# Patient Record
Sex: MALE | Race: WHITE | NOT HISPANIC OR LATINO
[De-identification: names, ages, dates, MRNs, and addresses within clinical notes are randomized per-mention and may not be internally consistent; named-entity substitution may affect disease eponyms.]

---

## 2017-02-17 ENCOUNTER — MEDICATION RENEWAL (OUTPATIENT)
Age: 47
End: 2017-02-17

## 2017-03-27 ENCOUNTER — APPOINTMENT (OUTPATIENT)
Dept: PULMONOLOGY | Facility: CLINIC | Age: 47
End: 2017-03-27

## 2017-03-27 VITALS
BODY MASS INDEX: 30.01 KG/M2 | SYSTOLIC BLOOD PRESSURE: 101 MMHG | HEART RATE: 87 BPM | WEIGHT: 198 LBS | RESPIRATION RATE: 16 BRPM | OXYGEN SATURATION: 98 % | HEIGHT: 68 IN | DIASTOLIC BLOOD PRESSURE: 70 MMHG | TEMPERATURE: 96.7 F

## 2017-03-28 ENCOUNTER — MEDICATION RENEWAL (OUTPATIENT)
Age: 47
End: 2017-03-28

## 2017-04-05 ENCOUNTER — OTHER (OUTPATIENT)
Age: 47
End: 2017-04-05

## 2017-04-07 ENCOUNTER — OUTPATIENT (OUTPATIENT)
Dept: OUTPATIENT SERVICES | Facility: HOSPITAL | Age: 47
LOS: 1 days | End: 2017-04-07
Payer: COMMERCIAL

## 2017-04-07 ENCOUNTER — APPOINTMENT (OUTPATIENT)
Dept: RADIOLOGY | Facility: IMAGING CENTER | Age: 47
End: 2017-04-07

## 2017-04-07 DIAGNOSIS — Z98.89 OTHER SPECIFIED POSTPROCEDURAL STATES: Chronic | ICD-10-CM

## 2017-04-07 DIAGNOSIS — Z00.8 ENCOUNTER FOR OTHER GENERAL EXAMINATION: ICD-10-CM

## 2017-04-07 PROCEDURE — 77080 DXA BONE DENSITY AXIAL: CPT

## 2017-04-26 ENCOUNTER — APPOINTMENT (OUTPATIENT)
Dept: PULMONOLOGY | Facility: CLINIC | Age: 47
End: 2017-04-26

## 2017-04-26 VITALS
HEIGHT: 68 IN | BODY MASS INDEX: 31.07 KG/M2 | TEMPERATURE: 99 F | SYSTOLIC BLOOD PRESSURE: 110 MMHG | DIASTOLIC BLOOD PRESSURE: 80 MMHG | RESPIRATION RATE: 18 BRPM | HEART RATE: 103 BPM | WEIGHT: 205 LBS

## 2017-05-22 ENCOUNTER — OTHER (OUTPATIENT)
Age: 47
End: 2017-05-22

## 2017-05-22 ENCOUNTER — APPOINTMENT (OUTPATIENT)
Dept: PULMONOLOGY | Facility: CLINIC | Age: 47
End: 2017-05-22

## 2017-06-19 ENCOUNTER — APPOINTMENT (OUTPATIENT)
Dept: PULMONOLOGY | Facility: CLINIC | Age: 47
End: 2017-06-19

## 2017-06-19 VITALS
TEMPERATURE: 97.8 F | HEART RATE: 67 BPM | HEIGHT: 68 IN | WEIGHT: 200 LBS | DIASTOLIC BLOOD PRESSURE: 90 MMHG | BODY MASS INDEX: 30.31 KG/M2 | RESPIRATION RATE: 15 BRPM | SYSTOLIC BLOOD PRESSURE: 120 MMHG

## 2017-06-22 ENCOUNTER — MEDICATION RENEWAL (OUTPATIENT)
Age: 47
End: 2017-06-22

## 2017-07-18 ENCOUNTER — APPOINTMENT (OUTPATIENT)
Dept: PULMONOLOGY | Facility: CLINIC | Age: 47
End: 2017-07-18

## 2017-07-18 VITALS
HEIGHT: 68 IN | SYSTOLIC BLOOD PRESSURE: 110 MMHG | RESPIRATION RATE: 14 BRPM | DIASTOLIC BLOOD PRESSURE: 60 MMHG | BODY MASS INDEX: 30.01 KG/M2 | OXYGEN SATURATION: 96 % | WEIGHT: 198 LBS | HEART RATE: 71 BPM | TEMPERATURE: 97 F

## 2017-10-05 ENCOUNTER — APPOINTMENT (OUTPATIENT)
Dept: PULMONOLOGY | Facility: CLINIC | Age: 47
End: 2017-10-05
Payer: COMMERCIAL

## 2017-10-05 VITALS
SYSTOLIC BLOOD PRESSURE: 139 MMHG | HEIGHT: 68 IN | BODY MASS INDEX: 30.01 KG/M2 | TEMPERATURE: 95.9 F | WEIGHT: 198 LBS | DIASTOLIC BLOOD PRESSURE: 80 MMHG | OXYGEN SATURATION: 97 % | RESPIRATION RATE: 16 BRPM | HEART RATE: 87 BPM

## 2017-10-05 PROCEDURE — 94729 DIFFUSING CAPACITY: CPT

## 2017-10-05 PROCEDURE — 99213 OFFICE O/P EST LOW 20 MIN: CPT | Mod: 25

## 2017-10-05 PROCEDURE — ZZZZZ: CPT

## 2017-10-05 PROCEDURE — 94726 PLETHYSMOGRAPHY LUNG VOLUMES: CPT

## 2017-10-05 PROCEDURE — 94010 BREATHING CAPACITY TEST: CPT

## 2017-12-05 ENCOUNTER — APPOINTMENT (OUTPATIENT)
Dept: PULMONOLOGY | Facility: CLINIC | Age: 47
End: 2017-12-05
Payer: COMMERCIAL

## 2017-12-05 VITALS
HEIGHT: 68 IN | OXYGEN SATURATION: 97 % | DIASTOLIC BLOOD PRESSURE: 80 MMHG | HEART RATE: 83 BPM | WEIGHT: 192 LBS | BODY MASS INDEX: 29.1 KG/M2 | SYSTOLIC BLOOD PRESSURE: 129 MMHG | TEMPERATURE: 97.1 F | RESPIRATION RATE: 16 BRPM

## 2017-12-05 PROCEDURE — 99213 OFFICE O/P EST LOW 20 MIN: CPT | Mod: 25

## 2017-12-05 PROCEDURE — 94729 DIFFUSING CAPACITY: CPT

## 2017-12-05 PROCEDURE — 94010 BREATHING CAPACITY TEST: CPT

## 2017-12-05 PROCEDURE — ZZZZZ: CPT

## 2017-12-05 PROCEDURE — 94726 PLETHYSMOGRAPHY LUNG VOLUMES: CPT

## 2017-12-12 ENCOUNTER — APPOINTMENT (OUTPATIENT)
Dept: UROLOGY | Facility: CLINIC | Age: 47
End: 2017-12-12
Payer: COMMERCIAL

## 2017-12-12 VITALS
HEIGHT: 67 IN | BODY MASS INDEX: 30.61 KG/M2 | HEART RATE: 123 BPM | WEIGHT: 195 LBS | SYSTOLIC BLOOD PRESSURE: 146 MMHG | DIASTOLIC BLOOD PRESSURE: 74 MMHG | RESPIRATION RATE: 18 BRPM

## 2017-12-12 PROCEDURE — 99244 OFF/OP CNSLTJ NEW/EST MOD 40: CPT

## 2017-12-12 RX ORDER — PAPAVERINE HYDROCHLORIDE 30 MG/ML
30 INJECTION, SOLUTION INTRAVENOUS
Qty: 2 | Refills: 3 | Status: ACTIVE | COMMUNITY
Start: 2017-12-12 | End: 1900-01-01

## 2017-12-13 LAB
25(OH)D3 SERPL-MCNC: 14.8 NG/ML
ALBUMIN SERPL ELPH-MCNC: 4.5 G/DL
ALP BLD-CCNC: 21 U/L
ALT SERPL-CCNC: 21 U/L
ANION GAP SERPL CALC-SCNC: 15 MMOL/L
AST SERPL-CCNC: 25 U/L
BASOPHILS # BLD AUTO: 0.05 K/UL
BASOPHILS NFR BLD AUTO: 0.7 %
BILIRUB SERPL-MCNC: 0.4 MG/DL
BUN SERPL-MCNC: 15 MG/DL
CALCIUM SERPL-MCNC: 9.6 MG/DL
CHLORIDE SERPL-SCNC: 103 MMOL/L
CHOLEST SERPL-MCNC: 198 MG/DL
CHOLEST/HDLC SERPL: 4.1 RATIO
CO2 SERPL-SCNC: 25 MMOL/L
CREAT SERPL-MCNC: 1.35 MG/DL
EOSINOPHIL # BLD AUTO: 0.08 K/UL
EOSINOPHIL NFR BLD AUTO: 1.1 %
ESTRADIOL SERPL-MCNC: 16 PG/ML
FSH SERPL-MCNC: 5.7 IU/L
GLUCOSE SERPL-MCNC: 73 MG/DL
HBA1C MFR BLD HPLC: 5.4 %
HCT VFR BLD CALC: 42.8 %
HDLC SERPL-MCNC: 48 MG/DL
HGB BLD-MCNC: 14.5 G/DL
IMM GRANULOCYTES NFR BLD AUTO: 0.3 %
LDLC SERPL CALC-MCNC: 110 MG/DL
LH SERPL-ACNC: 6.6 IU/L
LYMPHOCYTES # BLD AUTO: 1.4 K/UL
LYMPHOCYTES NFR BLD AUTO: 19.2 %
MAN DIFF?: NORMAL
MCHC RBC-ENTMCNC: 29.8 PG
MCHC RBC-ENTMCNC: 33.9 GM/DL
MCV RBC AUTO: 87.9 FL
MONOCYTES # BLD AUTO: 1.03 K/UL
MONOCYTES NFR BLD AUTO: 14.1 %
NEUTROPHILS # BLD AUTO: 4.71 K/UL
NEUTROPHILS NFR BLD AUTO: 64.6 %
PLATELET # BLD AUTO: 280 K/UL
POTASSIUM SERPL-SCNC: 4.7 MMOL/L
PROT SERPL-MCNC: 7.7 G/DL
RBC # BLD: 4.87 M/UL
RBC # FLD: 12.9 %
SODIUM SERPL-SCNC: 143 MMOL/L
TRIGL SERPL-MCNC: 201 MG/DL
TSH SERPL-ACNC: 2.32 UIU/ML
WBC # FLD AUTO: 7.29 K/UL

## 2017-12-17 ENCOUNTER — APPOINTMENT (OUTPATIENT)
Dept: CT IMAGING | Facility: IMAGING CENTER | Age: 47
End: 2017-12-17
Payer: COMMERCIAL

## 2017-12-17 ENCOUNTER — OUTPATIENT (OUTPATIENT)
Dept: OUTPATIENT SERVICES | Facility: HOSPITAL | Age: 47
LOS: 1 days | End: 2017-12-17
Payer: COMMERCIAL

## 2017-12-17 DIAGNOSIS — D49.89 NEOPLASM OF UNSPECIFIED BEHAVIOR OF OTHER SPECIFIED SITES: ICD-10-CM

## 2017-12-17 DIAGNOSIS — D86.9 SARCOIDOSIS, UNSPECIFIED: ICD-10-CM

## 2017-12-17 DIAGNOSIS — Z98.89 OTHER SPECIFIED POSTPROCEDURAL STATES: Chronic | ICD-10-CM

## 2017-12-17 PROCEDURE — 71250 CT THORAX DX C-: CPT

## 2017-12-17 PROCEDURE — 71250 CT THORAX DX C-: CPT | Mod: 26

## 2017-12-18 LAB
TESTOST BND SERPL-MCNC: 10.1 PG/ML
TESTOST SERPL-MCNC: 475.8 NG/DL

## 2018-01-09 ENCOUNTER — APPOINTMENT (OUTPATIENT)
Dept: UROLOGY | Facility: CLINIC | Age: 48
End: 2018-01-09
Payer: COMMERCIAL

## 2018-01-09 ENCOUNTER — OUTPATIENT (OUTPATIENT)
Dept: OUTPATIENT SERVICES | Facility: HOSPITAL | Age: 48
LOS: 1 days | End: 2018-01-09
Payer: COMMERCIAL

## 2018-01-09 VITALS
BODY MASS INDEX: 30.61 KG/M2 | TEMPERATURE: 98.1 F | DIASTOLIC BLOOD PRESSURE: 90 MMHG | RESPIRATION RATE: 16 BRPM | HEART RATE: 82 BPM | SYSTOLIC BLOOD PRESSURE: 136 MMHG | WEIGHT: 195 LBS | HEIGHT: 67 IN

## 2018-01-09 DIAGNOSIS — R35.0 FREQUENCY OF MICTURITION: ICD-10-CM

## 2018-01-09 DIAGNOSIS — Z98.89 OTHER SPECIFIED POSTPROCEDURAL STATES: Chronic | ICD-10-CM

## 2018-01-09 DIAGNOSIS — Z00.00 ENCOUNTER FOR GENERAL ADULT MEDICAL EXAMINATION W/OUT ABNORMAL FINDINGS: ICD-10-CM

## 2018-01-09 PROCEDURE — 93980 PENILE VASCULAR STUDY: CPT

## 2018-01-09 PROCEDURE — 54235 NJX CORPORA CAVERNOSA RX AGT: CPT

## 2018-01-09 PROCEDURE — 99214 OFFICE O/P EST MOD 30 MIN: CPT | Mod: 25

## 2018-01-09 PROCEDURE — 93980 PENILE VASCULAR STUDY: CPT | Mod: 26

## 2018-01-11 DIAGNOSIS — N52.9 MALE ERECTILE DYSFUNCTION, UNSPECIFIED: ICD-10-CM

## 2018-02-13 ENCOUNTER — APPOINTMENT (OUTPATIENT)
Dept: PULMONOLOGY | Facility: CLINIC | Age: 48
End: 2018-02-13
Payer: COMMERCIAL

## 2018-02-13 VITALS
BODY MASS INDEX: 29.82 KG/M2 | OXYGEN SATURATION: 98 % | TEMPERATURE: 98 F | SYSTOLIC BLOOD PRESSURE: 118 MMHG | HEIGHT: 67 IN | HEIGHT: 67 IN | HEART RATE: 63 BPM | RESPIRATION RATE: 15 BRPM | BODY MASS INDEX: 30.13 KG/M2 | DIASTOLIC BLOOD PRESSURE: 60 MMHG | WEIGHT: 190 LBS | WEIGHT: 192 LBS

## 2018-02-13 PROCEDURE — 94010 BREATHING CAPACITY TEST: CPT

## 2018-02-13 PROCEDURE — 99213 OFFICE O/P EST LOW 20 MIN: CPT | Mod: 25

## 2018-02-13 PROCEDURE — 94729 DIFFUSING CAPACITY: CPT

## 2018-02-13 PROCEDURE — 94726 PLETHYSMOGRAPHY LUNG VOLUMES: CPT

## 2018-02-13 PROCEDURE — ZZZZZ: CPT

## 2018-04-10 ENCOUNTER — APPOINTMENT (OUTPATIENT)
Dept: UROLOGY | Facility: CLINIC | Age: 48
End: 2018-04-10
Payer: COMMERCIAL

## 2018-04-10 PROCEDURE — 99214 OFFICE O/P EST MOD 30 MIN: CPT

## 2018-04-10 RX ORDER — SILDENAFIL 20 MG/1
20 TABLET ORAL
Qty: 50 | Refills: 3 | Status: ACTIVE | COMMUNITY
Start: 2017-12-12 | End: 1900-01-01

## 2018-04-17 ENCOUNTER — APPOINTMENT (OUTPATIENT)
Dept: PULMONOLOGY | Facility: CLINIC | Age: 48
End: 2018-04-17
Payer: COMMERCIAL

## 2018-04-17 VITALS — WEIGHT: 196 LBS | BODY MASS INDEX: 30.76 KG/M2 | HEIGHT: 67 IN

## 2018-04-17 VITALS
WEIGHT: 194 LBS | BODY MASS INDEX: 30.45 KG/M2 | OXYGEN SATURATION: 97 % | RESPIRATION RATE: 16 BRPM | TEMPERATURE: 96.3 F | HEART RATE: 95 BPM | DIASTOLIC BLOOD PRESSURE: 70 MMHG | HEIGHT: 67 IN | SYSTOLIC BLOOD PRESSURE: 120 MMHG

## 2018-04-17 PROCEDURE — 94726 PLETHYSMOGRAPHY LUNG VOLUMES: CPT

## 2018-04-17 PROCEDURE — 94729 DIFFUSING CAPACITY: CPT

## 2018-04-17 PROCEDURE — ZZZZZ: CPT

## 2018-04-17 PROCEDURE — 94010 BREATHING CAPACITY TEST: CPT

## 2018-04-17 PROCEDURE — 99213 OFFICE O/P EST LOW 20 MIN: CPT | Mod: 25

## 2018-04-17 RX ORDER — ERGOCALCIFEROL 1.25 MG/1
1.25 MG CAPSULE, LIQUID FILLED ORAL
Qty: 12 | Refills: 0 | Status: ACTIVE | COMMUNITY
Start: 2018-04-09

## 2018-05-30 ENCOUNTER — CHART COPY (OUTPATIENT)
Age: 48
End: 2018-05-30

## 2018-06-07 ENCOUNTER — APPOINTMENT (OUTPATIENT)
Dept: PULMONOLOGY | Facility: CLINIC | Age: 48
End: 2018-06-07
Payer: COMMERCIAL

## 2018-06-07 VITALS
BODY MASS INDEX: 30.13 KG/M2 | RESPIRATION RATE: 15 BRPM | DIASTOLIC BLOOD PRESSURE: 70 MMHG | SYSTOLIC BLOOD PRESSURE: 124 MMHG | WEIGHT: 192 LBS | HEART RATE: 106 BPM | TEMPERATURE: 97 F | OXYGEN SATURATION: 96 % | HEIGHT: 67 IN

## 2018-06-07 PROCEDURE — 94729 DIFFUSING CAPACITY: CPT

## 2018-06-07 PROCEDURE — 94010 BREATHING CAPACITY TEST: CPT

## 2018-06-07 PROCEDURE — ZZZZZ: CPT

## 2018-06-07 PROCEDURE — 99202 OFFICE O/P NEW SF 15 MIN: CPT | Mod: 25

## 2018-06-07 PROCEDURE — 99212 OFFICE O/P EST SF 10 MIN: CPT | Mod: 25

## 2018-06-07 PROCEDURE — 94726 PLETHYSMOGRAPHY LUNG VOLUMES: CPT

## 2018-07-02 ENCOUNTER — OTHER (OUTPATIENT)
Age: 48
End: 2018-07-02

## 2018-07-03 ENCOUNTER — OUTPATIENT (OUTPATIENT)
Dept: OUTPATIENT SERVICES | Facility: HOSPITAL | Age: 48
LOS: 1 days | End: 2018-07-03
Payer: COMMERCIAL

## 2018-07-03 ENCOUNTER — APPOINTMENT (OUTPATIENT)
Dept: RADIOLOGY | Facility: IMAGING CENTER | Age: 48
End: 2018-07-03
Payer: COMMERCIAL

## 2018-07-03 ENCOUNTER — APPOINTMENT (OUTPATIENT)
Dept: PULMONOLOGY | Facility: CLINIC | Age: 48
End: 2018-07-03
Payer: COMMERCIAL

## 2018-07-03 ENCOUNTER — NON-APPOINTMENT (OUTPATIENT)
Age: 48
End: 2018-07-03

## 2018-07-03 VITALS
TEMPERATURE: 96.7 F | OXYGEN SATURATION: 98 % | RESPIRATION RATE: 15 BRPM | HEART RATE: 77 BPM | SYSTOLIC BLOOD PRESSURE: 132 MMHG | HEIGHT: 67 IN | WEIGHT: 198 LBS | BODY MASS INDEX: 31.08 KG/M2 | DIASTOLIC BLOOD PRESSURE: 70 MMHG

## 2018-07-03 DIAGNOSIS — Z98.89 OTHER SPECIFIED POSTPROCEDURAL STATES: Chronic | ICD-10-CM

## 2018-07-03 DIAGNOSIS — D86.9 SARCOIDOSIS, UNSPECIFIED: ICD-10-CM

## 2018-07-03 PROCEDURE — 99213 OFFICE O/P EST LOW 20 MIN: CPT

## 2018-07-03 PROCEDURE — 71046 X-RAY EXAM CHEST 2 VIEWS: CPT | Mod: 26

## 2018-07-03 PROCEDURE — 71046 X-RAY EXAM CHEST 2 VIEWS: CPT

## 2018-07-10 ENCOUNTER — APPOINTMENT (OUTPATIENT)
Dept: UROLOGY | Facility: CLINIC | Age: 48
End: 2018-07-10
Payer: COMMERCIAL

## 2018-07-10 PROCEDURE — 99214 OFFICE O/P EST MOD 30 MIN: CPT

## 2018-08-02 ENCOUNTER — APPOINTMENT (OUTPATIENT)
Dept: PULMONOLOGY | Facility: CLINIC | Age: 48
End: 2018-08-02
Payer: COMMERCIAL

## 2018-08-02 VITALS
RESPIRATION RATE: 16 BRPM | DIASTOLIC BLOOD PRESSURE: 86 MMHG | WEIGHT: 195 LBS | HEIGHT: 67 IN | SYSTOLIC BLOOD PRESSURE: 130 MMHG | TEMPERATURE: 98.4 F | HEART RATE: 94 BPM | BODY MASS INDEX: 30.61 KG/M2

## 2018-08-02 PROCEDURE — ZZZZZ: CPT

## 2018-08-02 PROCEDURE — 94010 BREATHING CAPACITY TEST: CPT

## 2018-08-02 PROCEDURE — 99214 OFFICE O/P EST MOD 30 MIN: CPT | Mod: 25

## 2018-08-02 PROCEDURE — 94726 PLETHYSMOGRAPHY LUNG VOLUMES: CPT

## 2018-08-02 PROCEDURE — 94729 DIFFUSING CAPACITY: CPT

## 2018-09-12 ENCOUNTER — APPOINTMENT (OUTPATIENT)
Dept: PULMONOLOGY | Facility: CLINIC | Age: 48
End: 2018-09-12
Payer: COMMERCIAL

## 2018-09-12 VITALS
BODY MASS INDEX: 30.61 KG/M2 | TEMPERATURE: 97.8 F | HEIGHT: 67 IN | SYSTOLIC BLOOD PRESSURE: 140 MMHG | HEART RATE: 78 BPM | WEIGHT: 195 LBS | DIASTOLIC BLOOD PRESSURE: 94 MMHG | RESPIRATION RATE: 16 BRPM

## 2018-09-12 DIAGNOSIS — D49.89 NEOPLASM OF UNSPECIFIED BEHAVIOR OF OTHER SPECIFIED SITES: ICD-10-CM

## 2018-09-12 PROCEDURE — 94726 PLETHYSMOGRAPHY LUNG VOLUMES: CPT

## 2018-09-12 PROCEDURE — 99214 OFFICE O/P EST MOD 30 MIN: CPT | Mod: 25

## 2018-09-12 PROCEDURE — ZZZZZ: CPT

## 2018-09-12 PROCEDURE — 94010 BREATHING CAPACITY TEST: CPT

## 2018-09-12 PROCEDURE — 94729 DIFFUSING CAPACITY: CPT

## 2018-10-09 ENCOUNTER — APPOINTMENT (OUTPATIENT)
Dept: UROLOGY | Facility: CLINIC | Age: 48
End: 2018-10-09

## 2018-11-05 ENCOUNTER — MEDICATION RENEWAL (OUTPATIENT)
Age: 48
End: 2018-11-05

## 2018-12-18 ENCOUNTER — OUTPATIENT (OUTPATIENT)
Dept: OUTPATIENT SERVICES | Facility: HOSPITAL | Age: 48
LOS: 1 days | End: 2018-12-18
Payer: COMMERCIAL

## 2018-12-18 ENCOUNTER — APPOINTMENT (OUTPATIENT)
Dept: CT IMAGING | Facility: IMAGING CENTER | Age: 48
End: 2018-12-18
Payer: COMMERCIAL

## 2018-12-18 DIAGNOSIS — Z98.89 OTHER SPECIFIED POSTPROCEDURAL STATES: Chronic | ICD-10-CM

## 2018-12-18 DIAGNOSIS — Z00.8 ENCOUNTER FOR OTHER GENERAL EXAMINATION: ICD-10-CM

## 2018-12-18 DIAGNOSIS — D86.9 SARCOIDOSIS, UNSPECIFIED: ICD-10-CM

## 2018-12-18 PROCEDURE — 71250 CT THORAX DX C-: CPT | Mod: 26

## 2018-12-18 PROCEDURE — 71250 CT THORAX DX C-: CPT

## 2019-01-15 ENCOUNTER — APPOINTMENT (OUTPATIENT)
Dept: PULMONOLOGY | Facility: CLINIC | Age: 49
End: 2019-01-15
Payer: COMMERCIAL

## 2019-01-15 VITALS
RESPIRATION RATE: 16 BRPM | BODY MASS INDEX: 31.55 KG/M2 | SYSTOLIC BLOOD PRESSURE: 127 MMHG | HEART RATE: 71 BPM | OXYGEN SATURATION: 97 % | DIASTOLIC BLOOD PRESSURE: 91 MMHG | TEMPERATURE: 97.6 F | WEIGHT: 201 LBS | HEIGHT: 67 IN

## 2019-01-15 VITALS — BODY MASS INDEX: 31.08 KG/M2 | WEIGHT: 198 LBS | HEIGHT: 67 IN

## 2019-01-15 PROCEDURE — ZZZZZ: CPT

## 2019-01-15 PROCEDURE — 94729 DIFFUSING CAPACITY: CPT

## 2019-01-15 PROCEDURE — 99214 OFFICE O/P EST MOD 30 MIN: CPT | Mod: 25

## 2019-01-15 PROCEDURE — 94010 BREATHING CAPACITY TEST: CPT

## 2019-01-15 PROCEDURE — 94726 PLETHYSMOGRAPHY LUNG VOLUMES: CPT

## 2019-01-15 RX ORDER — PREDNISONE 5 MG/1
5 TABLET ORAL
Qty: 90 | Refills: 1 | Status: DISCONTINUED | COMMUNITY
Start: 2018-06-07 | End: 2019-01-15

## 2019-01-15 RX ORDER — PREDNISONE 5 MG/1
5 TABLET ORAL
Qty: 180 | Refills: 3 | Status: DISCONTINUED | COMMUNITY
Start: 2018-09-12 | End: 2019-01-15

## 2019-01-15 NOTE — DISCUSSION/SUMMARY
[FreeTextEntry1] : 49yo male patient with advanced pulmonary sarcoidosis here for PFT and f/u.  PFT shows much improvement in his flow rates and lung volumes, since he has been on 10mg of prednisone.  His chest CT shows stability of his sarcoid.  At this time, will opt to decrease prednisone to 7.5mg QD for the next 2 to 3 months.  He will return for f/u at that time with repeat lung function study.  If lung function is reduced, will consider starting second agent (i.e. methotrexate).  If lung function remains well, would opt to decrease prednisone by 1mg at that time.  At this time, he was encouraged to increase his physical activity.

## 2019-01-15 NOTE — REASON FOR VISIT
[Follow-Up] : a follow-up visit [Sarcoidosis] : sarcoidosis [Spouse] : spouse [Shortness of Breath] : shortness of Breath

## 2019-01-15 NOTE — HISTORY OF PRESENT ILLNESS
[FreeTextEntry1] : 47yo male patient with advanced pulmonary sarcoidosis here for PFT and f/u.  Currently on prednisone 10mg daily for three months.  In the past he had been dropped to 5mg from 10mg and experienced worsening dyspnea.  At that time, he was increased back to 10mg QD.  Patient reports improvement in his breathing at that time, but since December notes increased breathlessness and occasional wheezing with activity.  He does not do much in terms of physical activity.  Denies cough, chest discomfort, new joint pains or skin issues at this time.  \par \par His December 2018 chest CT was notable for: "Very subtle tiny nodules in the perilymphatic distribution ofboth lungs are unchanged when compared to previous exam. The finding is consistent with the patient's known history of sarcoidosis."\par

## 2019-01-15 NOTE — PHYSICAL EXAM
[General Appearance - Well Developed] : well developed [Normal Appearance] : normal appearance [Well Groomed] : well groomed [General Appearance - Well Nourished] : well nourished [General Appearance - In No Acute Distress] : no acute distress [Normal Conjunctiva] : the conjunctiva exhibited no abnormalities [Normal Oropharynx] : normal oropharynx [Neck Appearance] : the appearance of the neck was normal [Heart Rate And Rhythm] : heart rate and rhythm were normal [Heart Sounds] : normal S1 and S2 [] : no respiratory distress [Respiration, Rhythm And Depth] : normal respiratory rhythm and effort [Exaggerated Use Of Accessory Muscles For Inspiration] : no accessory muscle use [Auscultation Breath Sounds / Voice Sounds] : lungs were clear to auscultation bilaterally [Abnormal Walk] : normal gait [Nail Clubbing] : no clubbing of the fingernails [Cyanosis, Localized] : no localized cyanosis [No Venous Stasis] : no venous stasis [Skin Lesions] : no skin lesions [Sensation] : the sensory exam was normal to light touch and pinprick [Motor Exam] : the motor exam was normal [Affect] : the affect was normal [Mood] : the mood was normal [FreeTextEntry2] : no edema

## 2019-01-15 NOTE — REVIEW OF SYSTEMS
[Dyspnea] : dyspnea [Cough] : cough [Wheezing] : wheezing [Negative] : Musculoskeletal [Sputum] : not coughing up ~M sputum [Hemoptysis] : no hemoptysis [Chest Tightness] : no chest tightness [Pleuritic Pain] : no pleuritic pain

## 2019-03-30 NOTE — REASON FOR VISIT
[Follow-Up] : a follow-up visit [Sarcoidosis] : sarcoidosis [Shortness of Breath] : shortness of Breath [Spouse] : spouse

## 2019-04-02 ENCOUNTER — APPOINTMENT (OUTPATIENT)
Dept: PULMONOLOGY | Facility: CLINIC | Age: 49
End: 2019-04-02
Payer: COMMERCIAL

## 2019-04-02 VITALS
HEART RATE: 66 BPM | SYSTOLIC BLOOD PRESSURE: 126 MMHG | RESPIRATION RATE: 16 BRPM | WEIGHT: 188 LBS | TEMPERATURE: 98 F | DIASTOLIC BLOOD PRESSURE: 85 MMHG | OXYGEN SATURATION: 97 % | HEIGHT: 66 IN | BODY MASS INDEX: 30.22 KG/M2

## 2019-04-02 PROCEDURE — 94010 BREATHING CAPACITY TEST: CPT

## 2019-04-02 PROCEDURE — ZZZZZ: CPT

## 2019-04-02 PROCEDURE — 99214 OFFICE O/P EST MOD 30 MIN: CPT | Mod: 25

## 2019-04-02 NOTE — HISTORY OF PRESENT ILLNESS
[FreeTextEntry1] : 49yo male patient with advanced pulmonary sarcoidosis here for PFT and f/u.  Currently on 7.5 mg.He reports that he feels well. His dyspnea is improved but he has lost approximately 15 pounds since last visit. He denies any coughing, wheezing or chest discomfort. He denies any fevers, joint pains or skin eruptions.\par \par His December 2018 chest CT was notable for: "Very subtle tiny nodules in the perilymphatic distribution ofboth lungs are unchanged when compared to previous exam. The finding is consistent with the patient's known history of sarcoidosis."

## 2019-04-02 NOTE — REVIEW OF SYSTEMS
[Cough] : cough [Dyspnea] : dyspnea [Wheezing] : wheezing [Negative] : Dermatologic [Sputum] : not coughing up ~M sputum [Hemoptysis] : no hemoptysis [Chest Tightness] : no chest tightness [Pleuritic Pain] : no pleuritic pain

## 2019-04-02 NOTE — DISCUSSION/SUMMARY
[FreeTextEntry1] : 47yo male patient with advanced pulmonary sarcoidosis here for PFT and f/u.  Spirometry is relatively stable on 7.5 mg. His chest CT shows stability of his sarcoid.  At this time, will opt to maintain prednisone at 7.5mg QD for the next 3 months.  He will return for f/u at that time with repeat lung function study.  .  If lung function remains well, would opt to decrease prednisone by 1mg at that time.  At this time, he was encouraged to increase his physical activity.

## 2019-07-10 ENCOUNTER — APPOINTMENT (OUTPATIENT)
Dept: PULMONOLOGY | Facility: CLINIC | Age: 49
End: 2019-07-10
Payer: COMMERCIAL

## 2019-07-10 VITALS
TEMPERATURE: 97.4 F | DIASTOLIC BLOOD PRESSURE: 85 MMHG | OXYGEN SATURATION: 98 % | RESPIRATION RATE: 16 BRPM | BODY MASS INDEX: 28.89 KG/M2 | WEIGHT: 179 LBS | HEART RATE: 66 BPM | SYSTOLIC BLOOD PRESSURE: 135 MMHG

## 2019-07-10 PROCEDURE — 99213 OFFICE O/P EST LOW 20 MIN: CPT | Mod: 25

## 2019-07-10 PROCEDURE — ZZZZZ: CPT

## 2019-07-10 PROCEDURE — 94010 BREATHING CAPACITY TEST: CPT

## 2019-07-10 NOTE — DISCUSSION/SUMMARY
[FreeTextEntry1] : 49yo male patient with advanced pulmonary sarcoidosis here for PFT and f/u.  Spirometry is relatively stable on 7.5 mg. my current plan is to alternate 7.5 mg with 5 mg, on alternate days for 6 weeks and if he feels stable, to reduce to 5 mg per day and followup with me after an additional 6 weeks. he will have repeat lung functions at that time.

## 2019-07-10 NOTE — HISTORY OF PRESENT ILLNESS
[FreeTextEntry1] : 47yo male patient with advanced pulmonary sarcoidosis here for PFT and f/u.  Currently on 7.5 mg.He reports that he feels well. His dyspnea is improved . He denies any coughing, wheezing or chest discomfort. He denies any fevers, joint pains or skin eruptions.\par \par His December 2018 chest CT was notable for: "Very subtle tiny nodules in the perilymphatic distribution ofboth lungs are unchanged when compared to previous exam. The finding is consistent with the patient's known history of sarcoidosis."\par \par he has been on 7.5 mg of prednisone for 3 months.

## 2019-07-10 NOTE — PHYSICAL EXAM
[General Appearance - Well Developed] : well developed [Normal Appearance] : normal appearance [Well Groomed] : well groomed [General Appearance - Well Nourished] : well nourished [General Appearance - In No Acute Distress] : no acute distress [Normal Conjunctiva] : the conjunctiva exhibited no abnormalities [Normal Oropharynx] : normal oropharynx [Neck Appearance] : the appearance of the neck was normal [Heart Rate And Rhythm] : heart rate and rhythm were normal [Heart Sounds] : normal S1 and S2 [] : no respiratory distress [Exaggerated Use Of Accessory Muscles For Inspiration] : no accessory muscle use [Respiration, Rhythm And Depth] : normal respiratory rhythm and effort [Auscultation Breath Sounds / Voice Sounds] : lungs were clear to auscultation bilaterally [Abnormal Walk] : normal gait [Nail Clubbing] : no clubbing of the fingernails [Cyanosis, Localized] : no localized cyanosis [Skin Lesions] : no skin lesions [No Venous Stasis] : no venous stasis [Sensation] : the sensory exam was normal to light touch and pinprick [Motor Exam] : the motor exam was normal [Affect] : the affect was normal [Mood] : the mood was normal [FreeTextEntry2] : no edema

## 2019-08-29 ENCOUNTER — APPOINTMENT (OUTPATIENT)
Dept: UROLOGY | Facility: CLINIC | Age: 49
End: 2019-08-29
Payer: COMMERCIAL

## 2019-08-29 VITALS
RESPIRATION RATE: 16 BRPM | TEMPERATURE: 99.1 F | DIASTOLIC BLOOD PRESSURE: 81 MMHG | SYSTOLIC BLOOD PRESSURE: 123 MMHG | HEART RATE: 86 BPM

## 2019-08-29 DIAGNOSIS — N52.9 MALE ERECTILE DYSFUNCTION, UNSPECIFIED: ICD-10-CM

## 2019-08-29 PROCEDURE — 99214 OFFICE O/P EST MOD 30 MIN: CPT

## 2019-08-29 NOTE — HISTORY OF PRESENT ILLNESS
[FreeTextEntry1] : Patient with erectile dysfunction presents for follow up. Duplex u/s demionstrated venogenic dysfunction but good arterial inflow. He wanted to continue with tadalafil. he uses 10 to 20 mg.\par \par Currently he is doing well with 60 mg sildenafil. He has good erections enough to penetrate for relations. He is able to orgasm on this as well. His only complaint is visual problems, headaches, and nasal stuffiness. He is interested in trying cialis. He is still not interested in ICI therapy. Otherwise no complaints and doing well. \par \par \par PMH: He states that with 40 mg of Sildenifil he has pretty normal erection sometimes. He reports that at higher doses he has headaches and blurry vision and that he cannot tolerate the side effects at higher doses. He is not interested in ICI at this time.\par Patient presented with a chief complaint of erectile dysfunction. His erections are not modified with the degree of sexual stimulation. He states that his erections presently are oftentimes less than 5 out of 10 in both tumescence and rigidity, insufficient for penetration the majority of the time. He often ejaculates through a flaccid phallus. He has difficulty maintaining an erection. He describes a normal libido. His sexual dysfunction occurs with both sexual relations and masturbation. He has not tried PDE5 inhibitors\par \par His partner is understanding and was unable to be with him at the visit today. He is  and has adult children. \par \par His past medical history is non-contributory. 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 He does not smoke or drink He has no known drug allergies. His review of systems and past medical history demonstrates no significant urologic issues (see patient completed questionnaire). His family history demonstrates no significant urologic issues. Refer to his patient questionnaire for his AUA symptom score and sexual function symptom (normal is 60-75).\par \par He has a history of sarcoidosis on prednisone therapy. Reports having ED for years. Erections are typically 1/10 and last for a few seconds.\par \par He has multuple prior surgeries including right thigh lipoma excision, right inguinal hernia repair with mesh, vasectomy, lung biopsy, left hydrocelectomy.\par

## 2019-08-29 NOTE — ASSESSMENT
[FreeTextEntry1] : Patient with erectile dysfunction presents for follow up. Duplex u/s demonstrated venogenic dysfunction but good arterial inflow. He wanted to continue with tadalafil. he uses 10 to 20 mg. we discussed options for his erectile dysfunction.\par

## 2019-09-10 RX ORDER — TADALAFIL 20 MG/1
20 TABLET ORAL
Qty: 24 | Refills: 1 | Status: ACTIVE | COMMUNITY
Start: 2018-07-10

## 2019-09-30 ENCOUNTER — APPOINTMENT (OUTPATIENT)
Dept: PULMONOLOGY | Facility: CLINIC | Age: 49
End: 2019-09-30

## 2019-10-02 ENCOUNTER — APPOINTMENT (OUTPATIENT)
Dept: PULMONOLOGY | Facility: CLINIC | Age: 49
End: 2019-10-02
Payer: COMMERCIAL

## 2019-10-02 VITALS
TEMPERATURE: 98 F | BODY MASS INDEX: 28.89 KG/M2 | DIASTOLIC BLOOD PRESSURE: 81 MMHG | RESPIRATION RATE: 16 BRPM | HEART RATE: 75 BPM | WEIGHT: 179 LBS | SYSTOLIC BLOOD PRESSURE: 137 MMHG | OXYGEN SATURATION: 96 %

## 2019-10-02 VITALS — WEIGHT: 179 LBS | HEART RATE: 77 BPM | BODY MASS INDEX: 28.77 KG/M2 | OXYGEN SATURATION: 96 % | HEIGHT: 66 IN

## 2019-10-02 PROCEDURE — 99214 OFFICE O/P EST MOD 30 MIN: CPT | Mod: 25

## 2019-10-02 PROCEDURE — 94729 DIFFUSING CAPACITY: CPT

## 2019-10-02 PROCEDURE — 94726 PLETHYSMOGRAPHY LUNG VOLUMES: CPT

## 2019-10-02 PROCEDURE — 94010 BREATHING CAPACITY TEST: CPT

## 2019-10-02 NOTE — HISTORY OF PRESENT ILLNESS
[FreeTextEntry1] : 49-year-old male with extensive pulmonary sarcoid. Currently he feels well and complains only mild dyspnea. He denies any coughing or chest discomfort. He also denies any new joint pains or skin eruptions. We reduced his prednisone to 5 mg a day which he has been on for the last month. We have been titrating the dose down while monitoring his lung functions.

## 2019-10-02 NOTE — PHYSICAL EXAM
[General Appearance - In No Acute Distress] : no acute distress [Normal Conjunctiva] : the conjunctiva exhibited no abnormalities [Neck Appearance] : the appearance of the neck was normal [Heart Rate And Rhythm] : heart rate and rhythm were normal [Heart Sounds] : normal S1 and S2 [Respiration, Rhythm And Depth] : normal respiratory rhythm and effort [Auscultation Breath Sounds / Voice Sounds] : lungs were clear to auscultation bilaterally [Abnormal Walk] : normal gait [Nail Clubbing] : no clubbing of the fingernails [Cyanosis, Localized] : no localized cyanosis [] : no rash [No Focal Deficits] : no focal deficits [FreeTextEntry1] : occasional crackle

## 2019-10-02 NOTE — ASSESSMENT
[FreeTextEntry1] : . His lung function is stable. He feels reasonably normal. He has not suffered any side effects of the prednisone. He will be following up with his ophthalmologist. My plan is to continue on the prednisone at this dose for at least 3 more months at which point he will have repeat lung functions and we may attempt to reduce the dose at that point.

## 2020-01-08 ENCOUNTER — APPOINTMENT (OUTPATIENT)
Dept: PULMONOLOGY | Facility: CLINIC | Age: 50
End: 2020-01-08
Payer: COMMERCIAL

## 2020-01-08 VITALS — BODY MASS INDEX: 27.48 KG/M2 | HEIGHT: 66 IN | HEART RATE: 64 BPM | WEIGHT: 171 LBS | OXYGEN SATURATION: 98 %

## 2020-01-08 VITALS
RESPIRATION RATE: 15 BRPM | BODY MASS INDEX: 27.97 KG/M2 | HEIGHT: 66 IN | HEART RATE: 65 BPM | SYSTOLIC BLOOD PRESSURE: 133 MMHG | WEIGHT: 174 LBS | DIASTOLIC BLOOD PRESSURE: 90 MMHG | TEMPERATURE: 97.3 F

## 2020-01-08 PROCEDURE — 94726 PLETHYSMOGRAPHY LUNG VOLUMES: CPT

## 2020-01-08 PROCEDURE — 94729 DIFFUSING CAPACITY: CPT

## 2020-01-08 PROCEDURE — ZZZZZ: CPT

## 2020-01-08 PROCEDURE — 94010 BREATHING CAPACITY TEST: CPT

## 2020-01-08 PROCEDURE — 99213 OFFICE O/P EST LOW 20 MIN: CPT | Mod: 25

## 2020-01-08 RX ORDER — PREDNISONE 1 MG/1
1 TABLET ORAL
Qty: 240 | Refills: 5 | Status: DISCONTINUED | COMMUNITY
Start: 2017-12-05 | End: 2020-01-08

## 2020-01-08 RX ORDER — PREDNISONE 1 MG/1
1 TABLET ORAL DAILY
Qty: 200 | Refills: 2 | Status: DISCONTINUED | COMMUNITY
Start: 2018-02-13 | End: 2020-01-08

## 2020-01-08 NOTE — HISTORY OF PRESENT ILLNESS
[FreeTextEntry1] : 49-year-old male with chronic pulmonary sarcoidosis. The patient currently feels well and has been maintained on 5 mg of prednisone per day. On that dose his lung functions have been stable. He does admit to mild dyspnea on exertion but denies any wheezing, chest discomfort or cough. He continues to follow a diet and he has lost approximately 30 pounds.

## 2020-01-08 NOTE — ASSESSMENT
[FreeTextEntry1] : the patient's lung functions have improved such that his lung volumes are now normal. There is no evidence of any airflow extraction. His diffusing capacity remains mildly abnormal. My plan is to reduce his prednisone to alternate day 5 mg with 2.5 mg. He will stay on this regimen for at least 2 months and followup with me at that time he understands that if his symptoms were to recur that he needs he can touch with me sooner.

## 2020-01-08 NOTE — PHYSICAL EXAM
[General Appearance - In No Acute Distress] : no acute distress [Neck Appearance] : the appearance of the neck was normal [Normal Conjunctiva] : the conjunctiva exhibited no abnormalities [Heart Rate And Rhythm] : heart rate and rhythm were normal [Heart Sounds] : normal S1 and S2 [] : no respiratory distress [Auscultation Breath Sounds / Voice Sounds] : lungs were clear to auscultation bilaterally [Respiration, Rhythm And Depth] : normal respiratory rhythm and effort [Abnormal Walk] : normal gait [Nail Clubbing] : no clubbing of the fingernails [No Focal Deficits] : no focal deficits [Cyanosis, Localized] : no localized cyanosis

## 2020-03-09 ENCOUNTER — APPOINTMENT (OUTPATIENT)
Dept: PULMONOLOGY | Facility: CLINIC | Age: 50
End: 2020-03-09
Payer: COMMERCIAL

## 2020-03-09 VITALS
RESPIRATION RATE: 16 BRPM | OXYGEN SATURATION: 98 % | WEIGHT: 175 LBS | HEIGHT: 66 IN | HEART RATE: 85 BPM | BODY MASS INDEX: 28.12 KG/M2 | DIASTOLIC BLOOD PRESSURE: 86 MMHG | SYSTOLIC BLOOD PRESSURE: 131 MMHG | TEMPERATURE: 97.1 F

## 2020-03-09 VITALS — WEIGHT: 175 LBS | HEART RATE: 71 BPM | BODY MASS INDEX: 28.12 KG/M2 | OXYGEN SATURATION: 99 % | HEIGHT: 66 IN

## 2020-03-09 PROCEDURE — ZZZZZ: CPT

## 2020-03-09 PROCEDURE — 94010 BREATHING CAPACITY TEST: CPT

## 2020-03-09 PROCEDURE — 94726 PLETHYSMOGRAPHY LUNG VOLUMES: CPT

## 2020-03-09 PROCEDURE — 99213 OFFICE O/P EST LOW 20 MIN: CPT | Mod: 25

## 2020-03-09 PROCEDURE — 94729 DIFFUSING CAPACITY: CPT

## 2020-03-09 NOTE — PHYSICAL EXAM
[No Acute Distress] : no acute distress [Normal Appearance] : normal appearance [No Neck Mass] : no neck mass [Normal Rate/Rhythm] : normal rate/rhythm [Normal S1, S2] : normal s1, s2 [No Resp Distress] : no resp distress [Clear to Auscultation Bilaterally] : clear to auscultation bilaterally [Normal Gait] : normal gait [No Clubbing] : no clubbing [No Edema] : no edema

## 2020-03-20 ENCOUNTER — TRANSCRIPTION ENCOUNTER (OUTPATIENT)
Age: 50
End: 2020-03-20

## 2020-08-14 ENCOUNTER — RX RENEWAL (OUTPATIENT)
Age: 50
End: 2020-08-14

## 2020-08-27 ENCOUNTER — APPOINTMENT (OUTPATIENT)
Dept: DISASTER EMERGENCY | Facility: CLINIC | Age: 50
End: 2020-08-27

## 2020-08-29 ENCOUNTER — APPOINTMENT (OUTPATIENT)
Dept: DISASTER EMERGENCY | Facility: CLINIC | Age: 50
End: 2020-08-29

## 2020-08-29 DIAGNOSIS — Z01.818 ENCOUNTER FOR OTHER PREPROCEDURAL EXAMINATION: ICD-10-CM

## 2020-08-30 LAB — SARS-COV-2 N GENE NPH QL NAA+PROBE: NOT DETECTED

## 2020-08-31 ENCOUNTER — APPOINTMENT (OUTPATIENT)
Dept: DISASTER EMERGENCY | Facility: CLINIC | Age: 50
End: 2020-08-31

## 2020-09-02 ENCOUNTER — APPOINTMENT (OUTPATIENT)
Dept: PULMONOLOGY | Facility: CLINIC | Age: 50
End: 2020-09-02
Payer: COMMERCIAL

## 2020-09-02 VITALS
WEIGHT: 184 LBS | RESPIRATION RATE: 16 BRPM | OXYGEN SATURATION: 97 % | HEIGHT: 66 IN | BODY MASS INDEX: 29.57 KG/M2 | TEMPERATURE: 97.8 F | HEART RATE: 78 BPM | DIASTOLIC BLOOD PRESSURE: 88 MMHG | SYSTOLIC BLOOD PRESSURE: 143 MMHG

## 2020-09-02 VITALS
BODY MASS INDEX: 28.93 KG/M2 | DIASTOLIC BLOOD PRESSURE: 92 MMHG | TEMPERATURE: 96.8 F | OXYGEN SATURATION: 99 % | HEART RATE: 80 BPM | WEIGHT: 180 LBS | HEIGHT: 66 IN | RESPIRATION RATE: 16 BRPM | SYSTOLIC BLOOD PRESSURE: 144 MMHG

## 2020-09-02 VITALS
HEIGHT: 66 IN | BODY MASS INDEX: 29.57 KG/M2 | OXYGEN SATURATION: 98 % | WEIGHT: 184 LBS | HEART RATE: 88 BPM | TEMPERATURE: 97.3 F

## 2020-09-02 PROCEDURE — ZZZZZ: CPT

## 2020-09-02 PROCEDURE — 99214 OFFICE O/P EST MOD 30 MIN: CPT | Mod: 25

## 2020-09-02 PROCEDURE — 94729 DIFFUSING CAPACITY: CPT

## 2020-09-02 PROCEDURE — 94010 BREATHING CAPACITY TEST: CPT

## 2020-09-02 PROCEDURE — 94726 PLETHYSMOGRAPHY LUNG VOLUMES: CPT

## 2020-09-02 NOTE — HISTORY OF PRESENT ILLNESS
[TextBox_4] : 50-year-old male with chronic sarcoidosis. The patient has been on a slowly tapering dose of prednisone while monitoring his lung functions. In January of this year his lung functions were almost normal and at that point we reduced his prednisone further. But in March we noticed a reduction in lung function and again today his lung functions decreased slightly, particularly in lung volumes. He does sense a bit more dyspnea. He denies any other systemic symptoms. He denies joint pains, fevers, skin eruptions but is complaining of intermittent sharp back pain that lasts moments. He is also complaining of ear congestion.

## 2020-09-02 NOTE — ASSESSMENT
[FreeTextEntry1] : because of the decline in lung function, I suggested that he maintain his current prednisone dose at 5 mg alternating with 2.5 mg. I reviewed his imaging with him and I will repeat a CAT scan within the next month. Onset is done, we'll decide about further testing

## 2020-09-02 NOTE — REVIEW OF SYSTEMS
[Back Pain] : back pain [Negative] : Endocrine [TextBox_14] : as in history of present illness [TextBox_30] : as in history of present illness

## 2020-09-02 NOTE — PHYSICAL EXAM
[No Acute Distress] : no acute distress [Normal Appearance] : normal appearance [No Neck Mass] : no neck mass [Normal Rate/Rhythm] : normal rate/rhythm [Normal S1, S2] : normal s1, s2 [No Resp Distress] : no resp distress [Clear to Auscultation Bilaterally] : clear to auscultation bilaterally [Normal Gait] : normal gait [No Focal Deficits] : no focal deficits [No Edema] : no edema [No Clubbing] : no clubbing [TextBox_125] : round, nonraised, pigmented lesion right shoulder [TextBox_11] : eardrums congested

## 2020-09-02 NOTE — HISTORY OF PRESENT ILLNESS
[TextBox_4] : 50-year-old male with extensive pulmonary sarcoid, currently on prednisone 5 mg alternating with 2 mg daily. He reports no new symptoms. He may feel a little bit more breathless than 2 months ago. He denies any chest discomfort, fevers, joint pains or skin eruptions.

## 2020-09-02 NOTE — ASSESSMENT
[FreeTextEntry1] : the patient's lung functions are not as good as they were last visit . Nevertheless I will hold his prednisone at this dose, 5 mg alternating with 2-1/2 mg every other day until next visit.

## 2020-10-02 ENCOUNTER — APPOINTMENT (OUTPATIENT)
Dept: CT IMAGING | Facility: CLINIC | Age: 50
End: 2020-10-02
Payer: COMMERCIAL

## 2020-10-02 ENCOUNTER — OUTPATIENT (OUTPATIENT)
Dept: OUTPATIENT SERVICES | Facility: HOSPITAL | Age: 50
LOS: 1 days | End: 2020-10-02
Payer: COMMERCIAL

## 2020-10-02 DIAGNOSIS — D86.9 SARCOIDOSIS, UNSPECIFIED: ICD-10-CM

## 2020-10-02 DIAGNOSIS — Z98.89 OTHER SPECIFIED POSTPROCEDURAL STATES: Chronic | ICD-10-CM

## 2020-10-02 DIAGNOSIS — Z00.8 ENCOUNTER FOR OTHER GENERAL EXAMINATION: ICD-10-CM

## 2020-10-02 PROCEDURE — 71250 CT THORAX DX C-: CPT | Mod: 26

## 2020-10-02 PROCEDURE — 71250 CT THORAX DX C-: CPT

## 2020-10-13 ENCOUNTER — APPOINTMENT (OUTPATIENT)
Dept: PULMONOLOGY | Facility: CLINIC | Age: 50
End: 2020-10-13

## 2020-10-23 DIAGNOSIS — U07.1 COVID-19: ICD-10-CM

## 2020-10-30 ENCOUNTER — NON-APPOINTMENT (OUTPATIENT)
Age: 50
End: 2020-10-30

## 2021-01-26 ENCOUNTER — APPOINTMENT (OUTPATIENT)
Dept: DISASTER EMERGENCY | Facility: CLINIC | Age: 51
End: 2021-01-26

## 2021-01-27 LAB — SARS-COV-2 N GENE NPH QL NAA+PROBE: NOT DETECTED

## 2021-01-29 ENCOUNTER — APPOINTMENT (OUTPATIENT)
Dept: PULMONOLOGY | Facility: CLINIC | Age: 51
End: 2021-01-29
Payer: COMMERCIAL

## 2021-01-29 VITALS
OXYGEN SATURATION: 96 % | HEIGHT: 67 IN | SYSTOLIC BLOOD PRESSURE: 147 MMHG | TEMPERATURE: 98.1 F | RESPIRATION RATE: 15 BRPM | HEART RATE: 87 BPM | DIASTOLIC BLOOD PRESSURE: 96 MMHG | BODY MASS INDEX: 29.66 KG/M2 | WEIGHT: 189 LBS

## 2021-01-29 VITALS
BODY MASS INDEX: 29.66 KG/M2 | TEMPERATURE: 97.5 F | OXYGEN SATURATION: 97 % | WEIGHT: 189 LBS | HEART RATE: 88 BPM | HEIGHT: 67 IN

## 2021-01-29 PROCEDURE — 94726 PLETHYSMOGRAPHY LUNG VOLUMES: CPT

## 2021-01-29 PROCEDURE — 99213 OFFICE O/P EST LOW 20 MIN: CPT | Mod: 25

## 2021-01-29 PROCEDURE — ZZZZZ: CPT

## 2021-01-29 PROCEDURE — 99072 ADDL SUPL MATRL&STAF TM PHE: CPT

## 2021-01-29 PROCEDURE — 94010 BREATHING CAPACITY TEST: CPT

## 2021-01-29 PROCEDURE — 94729 DIFFUSING CAPACITY: CPT

## 2021-01-29 NOTE — PHYSICAL EXAM
[No Acute Distress] : no acute distress [Normal Appearance] : normal appearance [No Neck Mass] : no neck mass [Normal Rate/Rhythm] : normal rate/rhythm [Normal S1, S2] : normal s1, s2 [No Resp Distress] : no resp distress [Normal Gait] : normal gait [No Clubbing] : no clubbing [No Edema] : no edema [No Focal Deficits] : no focal deficits [Oriented x3] : oriented x3 [TextBox_68] : few crackles right lung base

## 2021-01-29 NOTE — HISTORY OF PRESENT ILLNESS
[TextBox_4] : 50-year-old male with extensive pulmonary sarcoid that was diagnosed 6 years ago with lung biopsy. Since that time the patient has been on prednisone and most recently on a tapering dose and has reached 5 mg alternating with 2.5 mg daily. He reports that he feels approximately the same, may be a bit more dyspneic on exertion. He denies any chest pain, cough, wheezing or fevers. His appetite remains\par . He denies any vision problems or eye inflammation. He does report an eczema rash mom our area and left chest area that seems to be dissipating. It was pruritic.

## 2021-01-29 NOTE — ASSESSMENT
[FreeTextEntry1] : the patient's lung functions are not as good as they were but stable. Therefore I am reluctant to reduce his prednisone dosage and will maintain him on 5 alternating with 2.5 mg. He seems to have suffered no ill effects from the novel coronavirus. He will follow up with me in 3 months.

## 2021-04-27 ENCOUNTER — APPOINTMENT (OUTPATIENT)
Dept: DISASTER EMERGENCY | Facility: CLINIC | Age: 51
End: 2021-04-27

## 2021-04-27 ENCOUNTER — RX RENEWAL (OUTPATIENT)
Age: 51
End: 2021-04-27

## 2021-04-28 LAB — SARS-COV-2 N GENE NPH QL NAA+PROBE: NOT DETECTED

## 2021-04-30 ENCOUNTER — APPOINTMENT (OUTPATIENT)
Dept: PULMONOLOGY | Facility: CLINIC | Age: 51
End: 2021-04-30
Payer: COMMERCIAL

## 2021-04-30 VITALS
DIASTOLIC BLOOD PRESSURE: 99 MMHG | RESPIRATION RATE: 15 BRPM | OXYGEN SATURATION: 98 % | SYSTOLIC BLOOD PRESSURE: 141 MMHG | TEMPERATURE: 97.6 F | WEIGHT: 180 LBS | HEART RATE: 83 BPM | BODY MASS INDEX: 28.25 KG/M2 | HEIGHT: 67 IN

## 2021-04-30 VITALS
HEIGHT: 67 IN | TEMPERATURE: 96.4 F | WEIGHT: 180 LBS | OXYGEN SATURATION: 97 % | BODY MASS INDEX: 28.25 KG/M2 | HEART RATE: 80 BPM

## 2021-04-30 PROCEDURE — ZZZZZ: CPT

## 2021-04-30 PROCEDURE — 94010 BREATHING CAPACITY TEST: CPT

## 2021-04-30 PROCEDURE — 99072 ADDL SUPL MATRL&STAF TM PHE: CPT

## 2021-04-30 PROCEDURE — 94729 DIFFUSING CAPACITY: CPT

## 2021-04-30 PROCEDURE — 99213 OFFICE O/P EST LOW 20 MIN: CPT | Mod: 25

## 2021-04-30 PROCEDURE — 94726 PLETHYSMOGRAPHY LUNG VOLUMES: CPT

## 2021-09-30 ENCOUNTER — EMERGENCY (EMERGENCY)
Facility: HOSPITAL | Age: 51
LOS: 1 days | Discharge: HOME | End: 2021-09-30
Attending: EMERGENCY MEDICINE | Admitting: EMERGENCY MEDICINE
Payer: COMMERCIAL

## 2021-09-30 VITALS
DIASTOLIC BLOOD PRESSURE: 89 MMHG | RESPIRATION RATE: 16 BRPM | OXYGEN SATURATION: 99 % | SYSTOLIC BLOOD PRESSURE: 140 MMHG | HEART RATE: 75 BPM | TEMPERATURE: 98 F

## 2021-09-30 VITALS
TEMPERATURE: 98 F | OXYGEN SATURATION: 99 % | DIASTOLIC BLOOD PRESSURE: 116 MMHG | WEIGHT: 184.97 LBS | RESPIRATION RATE: 18 BRPM | HEIGHT: 67 IN | HEART RATE: 77 BPM | SYSTOLIC BLOOD PRESSURE: 152 MMHG

## 2021-09-30 DIAGNOSIS — Z98.89 OTHER SPECIFIED POSTPROCEDURAL STATES: Chronic | ICD-10-CM

## 2021-09-30 PROCEDURE — 99284 EMERGENCY DEPT VISIT MOD MDM: CPT

## 2021-09-30 PROCEDURE — 72125 CT NECK SPINE W/O DYE: CPT | Mod: MA

## 2021-09-30 PROCEDURE — 99053 MED SERV 10PM-8AM 24 HR FAC: CPT

## 2021-09-30 PROCEDURE — 99284 EMERGENCY DEPT VISIT MOD MDM: CPT | Mod: 25

## 2021-09-30 PROCEDURE — 72125 CT NECK SPINE W/O DYE: CPT | Mod: 26,MA
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
## 2021-09-30 NOTE — ED PROVIDER NOTE - NSFOLLOWUPINSTRUCTIONS_ED_ALL_ED_FT
Rest  Motrin for pain if needed  Follow up with your doctor this week  Return here if needed      [employer]® CareNotes®     :  [facility]  	                       CERVICAL STRAIN - AfterCare(R) Instructions(ER/ED)           Cervical Strain    WHAT YOU NEED TO KNOW:    A cervical strain is a stretched or torn muscle or tendon in your neck. Tendons are strong tissues that connect muscles to bones.    DISCHARGE INSTRUCTIONS:    Return to the emergency department if:   •You have pain or numbness from your shoulder down to your hand.      •You have problems with your vision, hearing, or balance.      •You feel confused or cannot concentrate.      •You have problems with movement and strength.      Call your doctor if:   •You have increased swelling or pain in your neck.       •You have questions or concerns about your condition or care.      Medicines: You may need any of the following:   •Acetaminophen decreases pain and fever. It is available without a doctor's order. Ask how much to take and how often to take it. Follow directions. Read the labels of all other medicines you are using to see if they also contain acetaminophen, or ask your doctor or pharmacist. Acetaminophen can cause liver damage if not taken correctly. Do not use more than 4 grams (4,000 milligrams) total of acetaminophen in one day.       •NSAIDs, such as ibuprofen, help decrease swelling, pain, and fever. This medicine is available with or without a doctor's order. NSAIDs can cause stomach bleeding or kidney problems in certain people. If you take blood thinner medicine, always ask your healthcare provider if NSAIDs are safe for you. Always read the medicine label and follow directions.      •Muscle relaxers help decrease pain and muscle spasms.      •Prescription pain medicine may be given. Ask your healthcare provider how to take this medicine safely. Some prescription pain medicines contain acetaminophen. Do not take other medicines that contain acetaminophen without talking to your healthcare provider. Too much acetaminophen may cause liver damage. Prescription pain medicine may cause constipation. Ask your healthcare provider how to prevent or treat constipation.       •Take your medicine as directed. Contact your healthcare provider if you think your medicine is not helping or if you have side effects. Tell him or her if you are allergic to any medicine. Keep a list of the medicines, vitamins, and herbs you take. Include the amounts, and when and why you take them. Bring the list or the pill bottles to follow-up visits. Carry your medicine list with you in case of an emergency.      Manage your symptoms:   •Apply heat on your neck for 15 to 20 minutes, 4 to 6 times a day or as directed. Heat helps decrease pain, stiffness, and muscle spasms.      •Begin gentle neck exercises as soon as you can move your neck without pain. Exercises will help decrease stiffness and improve the strength and movement of your neck. Ask your healthcare provider what kind of exercises you should do.      •Gradually return to your usual activities as directed. Stop if you have pain. Avoid activities that can cause more damage to your neck, such as heavy lifting or strenuous exercise.      •Sleep without a pillow to help decrease pain. Instead, roll a small towel tightly and place it under your neck.       •Go to physical therapy as directed. A physical therapist teaches you exercises to help improve movement and strength, and to decrease pain.       Prevent another neck injury:   •Drive safely. Make sure everyone in your car wears a seatbelt. A seatbelt can save your life if you are in an accident. Do not use your cell phone when you are driving. This could distract you and cause an accident. Pull over if you need to make a call or send a text message.       •Wear helmets, lifejackets, and protective gear. Always wear a helmet when you ride a bike or motorcycle, go skiing, or play sports that could cause a head injury. Wear protective equipment when you play sports. Wear a lifejacket when you are on a boat or doing water sports.       Follow up with your doctor as directed: You may be referred to an orthopedist or physical therapies. Write down your questions so you remember to ask them during your visits.

## 2021-09-30 NOTE — ED PROVIDER NOTE - NSICDXFAMILYHX_GEN_ALL_CORE_FT
FAMILY HISTORY:  Father  Still living? Unknown  Family history of diabetes mellitus, Age at diagnosis: Age Unknown    Mother  Still living? Yes, Estimated age: Age Unknown  Family history of colon cancer, Age at diagnosis: Age Unknown  Family history of diabetes mellitus, Age at diagnosis: Age Unknown

## 2021-09-30 NOTE — ED PROVIDER NOTE - CARE PROVIDER_API CALL
Selvin Otero (MD)  Orthopaedic Surgery  [address] [place] [address]  Phone: [phone]  Fax: [phone]  Follow Up Time:

## 2021-09-30 NOTE — ED PROVIDER NOTE - PATIENT PORTAL LINK FT
You can access the FollowMyHealth Patient Portal offered by [facility] by registering at the following website: [email or web address]. By joining [employer]’s FollowMyHealth portal, you will also be able to view your health information using other applications (apps) compatible with our system.

## 2021-09-30 NOTE — ED PROVIDER NOTE - NSICDXPASTSURGICALHX_GEN_ALL_CORE_FT
PAST SURGICAL HISTORY:  H/O hernia repair RIH repair 1/14    History of lung biopsy October 2014

## 2021-11-13 ENCOUNTER — APPOINTMENT (OUTPATIENT)
Dept: DISASTER EMERGENCY | Facility: CLINIC | Age: 51
End: 2021-11-13

## 2021-11-14 LAB — SARS-COV-2 N GENE NPH QL NAA+PROBE: NOT DETECTED

## 2021-11-17 ENCOUNTER — APPOINTMENT (OUTPATIENT)
Dept: PULMONOLOGY | Facility: CLINIC | Age: 51
End: 2021-11-17
Payer: COMMERCIAL

## 2021-11-17 VITALS
TEMPERATURE: 97.3 F | WEIGHT: 185 LBS | OXYGEN SATURATION: 99 % | BODY MASS INDEX: 29.03 KG/M2 | HEIGHT: 67 IN | HEART RATE: 78 BPM

## 2021-11-17 VITALS — HEART RATE: 82 BPM | SYSTOLIC BLOOD PRESSURE: 128 MMHG | DIASTOLIC BLOOD PRESSURE: 85 MMHG | RESPIRATION RATE: 16 BRPM

## 2021-11-17 PROCEDURE — 94726 PLETHYSMOGRAPHY LUNG VOLUMES: CPT

## 2021-11-17 PROCEDURE — ZZZZZ: CPT

## 2021-11-17 PROCEDURE — 94729 DIFFUSING CAPACITY: CPT

## 2021-11-17 PROCEDURE — 99213 OFFICE O/P EST LOW 20 MIN: CPT | Mod: 25

## 2021-11-17 PROCEDURE — 94010 BREATHING CAPACITY TEST: CPT

## 2021-11-17 RX ORDER — PREDNISONE 2.5 MG/1
2.5 TABLET ORAL
Qty: 100 | Refills: 2 | Status: ACTIVE | COMMUNITY
Start: 2017-07-18 | End: 1900-01-01

## 2021-11-17 NOTE — REVIEW OF SYSTEMS
[SOB on Exertion] : sob on exertion [Negative] : Cardiovascular [Cough] : no cough [Chest Tightness] : no chest tightness [Sputum] : no sputum [Dyspnea] : no dyspnea [Pleuritic Pain] : no pleuritic pain [Wheezing] : no wheezing

## 2021-11-17 NOTE — ASSESSMENT
[FreeTextEntry1] : 51-year-old male with chronic pulmonary sarcoid.  Pt will need a repeat CT scan for further evaluation of disease.  If the CT scan looks improved compared to 10/20, will try reducing his prednisone to  5 mg every other day.  His PFTs are stable.

## 2021-11-17 NOTE — END OF VISIT
[FreeTextEntry3] : I obtained the history and examined the patient and developed a plan of care  Oren Hoang MD\par

## 2021-11-17 NOTE — HISTORY OF PRESENT ILLNESS
[TextBox_4] : 51-year-old male with chronic pulmonary sarcoid. The patient reports that he is feeling a little more dyspneic than previously. He denies any cough or chest discomfort. He has been exercising more and losing weight. He denies any skin eruptions, joint pains or eye symptoms. He has a little numbness in the medial distribution of his left hand. He has a history of carpal tunnel syndrome on the right.

## 2021-11-17 NOTE — ASSESSMENT
[FreeTextEntry1] : the patient's lung functions have remained stable. They were clearly better on higher dose prednisone but for now I will continue him on 5 mg alternating with 2-1/2 mg. We discussed this in detail. It is sarcoid is clearly still active. I plan to see him again in a year with a similar followup of lung functions. I will consider imaging before even considering taking him down on the dosage of prednisone.

## 2021-11-17 NOTE — HISTORY OF PRESENT ILLNESS
[TextBox_4] : 51-year-old male with chronic pulmonary sarcoid. He denies any cough or chest discomfort. He has been exercising more and losing weight. He denies any skin eruptions, joint pains or eye symptoms.He has been alternating between prednisone 5 mg and prednisone 2 1/2 mg every other day.  He reports some dyspnea with minimal exertion such as going up a flight of stairs.  He will be following with opthalmology in the next couple of months.

## 2021-11-29 ENCOUNTER — NON-APPOINTMENT (OUTPATIENT)
Age: 51
End: 2021-11-29

## 2021-12-19 ENCOUNTER — APPOINTMENT (OUTPATIENT)
Dept: CT IMAGING | Facility: IMAGING CENTER | Age: 51
End: 2021-12-19
Payer: COMMERCIAL

## 2021-12-19 ENCOUNTER — OUTPATIENT (OUTPATIENT)
Dept: OUTPATIENT SERVICES | Facility: HOSPITAL | Age: 51
LOS: 1 days | End: 2021-12-19
Payer: COMMERCIAL

## 2021-12-19 DIAGNOSIS — Z00.8 ENCOUNTER FOR OTHER GENERAL EXAMINATION: ICD-10-CM

## 2021-12-19 DIAGNOSIS — Z98.89 OTHER SPECIFIED POSTPROCEDURAL STATES: Chronic | ICD-10-CM

## 2021-12-19 DIAGNOSIS — D86.9 SARCOIDOSIS, UNSPECIFIED: ICD-10-CM

## 2021-12-19 PROCEDURE — 71250 CT THORAX DX C-: CPT | Mod: 26

## 2021-12-19 PROCEDURE — 71250 CT THORAX DX C-: CPT

## 2021-12-21 ENCOUNTER — NON-APPOINTMENT (OUTPATIENT)
Age: 51
End: 2021-12-21

## 2022-03-04 ENCOUNTER — APPOINTMENT (OUTPATIENT)
Dept: PULMONOLOGY | Facility: CLINIC | Age: 52
End: 2022-03-04
Payer: COMMERCIAL

## 2022-03-04 VITALS
DIASTOLIC BLOOD PRESSURE: 98 MMHG | HEART RATE: 82 BPM | BODY MASS INDEX: 30.76 KG/M2 | SYSTOLIC BLOOD PRESSURE: 142 MMHG | WEIGHT: 196 LBS | OXYGEN SATURATION: 99 % | TEMPERATURE: 98 F | HEIGHT: 67 IN

## 2022-03-04 VITALS
HEART RATE: 78 BPM | RESPIRATION RATE: 15 BRPM | TEMPERATURE: 98 F | HEIGHT: 67 IN | DIASTOLIC BLOOD PRESSURE: 97 MMHG | WEIGHT: 198 LBS | BODY MASS INDEX: 31.08 KG/M2 | SYSTOLIC BLOOD PRESSURE: 152 MMHG | OXYGEN SATURATION: 96 %

## 2022-03-04 PROCEDURE — 99213 OFFICE O/P EST LOW 20 MIN: CPT | Mod: 25

## 2022-03-04 PROCEDURE — 94010 BREATHING CAPACITY TEST: CPT

## 2022-03-04 PROCEDURE — 94726 PLETHYSMOGRAPHY LUNG VOLUMES: CPT

## 2022-03-04 PROCEDURE — 94729 DIFFUSING CAPACITY: CPT

## 2022-03-04 PROCEDURE — ZZZZZ: CPT

## 2022-03-04 NOTE — PHYSICAL EXAM
[No Acute Distress] : no acute distress [Normal Rate/Rhythm] : normal rate/rhythm [Normal S1, S2] : normal s1, s2 [No Resp Distress] : no resp distress [Clear to Auscultation Bilaterally] : clear to auscultation bilaterally [Normal Gait] : normal gait [No Clubbing] : no clubbing

## 2022-05-24 ENCOUNTER — RX RENEWAL (OUTPATIENT)
Age: 52
End: 2022-05-24

## 2022-05-24 RX ORDER — PREDNISONE 5 MG/1
5 TABLET ORAL
Qty: 100 | Refills: 0 | Status: ACTIVE | COMMUNITY
Start: 2019-04-02 | End: 1900-01-01

## 2022-06-27 ENCOUNTER — APPOINTMENT (OUTPATIENT)
Dept: SURGERY | Facility: CLINIC | Age: 52
End: 2022-06-27

## 2022-07-14 ENCOUNTER — APPOINTMENT (OUTPATIENT)
Dept: SURGERY | Facility: CLINIC | Age: 52
End: 2022-07-14

## 2022-07-14 VITALS
HEART RATE: 66 BPM | TEMPERATURE: 97.7 F | WEIGHT: 195 LBS | BODY MASS INDEX: 30.61 KG/M2 | DIASTOLIC BLOOD PRESSURE: 95 MMHG | HEIGHT: 67 IN | OXYGEN SATURATION: 98 % | SYSTOLIC BLOOD PRESSURE: 142 MMHG

## 2022-07-14 PROCEDURE — 99204 OFFICE O/P NEW MOD 45 MIN: CPT

## 2022-07-14 NOTE — REVIEW OF SYSTEMS
[Shortness Of Breath] : shortness of breath [Wheezing] : no wheezing [Cough] : no cough [SOB on Exertion] : no shortness of breath during exertion [Orthopnea] : no orthopnea [PND] : no PND [Negative] : Heme/Lymph [FreeTextEntry6] : has sarcoidosis

## 2022-07-14 NOTE — HISTORY OF PRESENT ILLNESS
[de-identified] : Incarcerated umbilical hernia and left inguinal hernia. [de-identified] : 52 year old white male with umbilical swelling for the past few years that has increased in size. Pt has recently had pain in the area. He denies any nausea or vomiting, He has not had any changes in his bowel habits. He denies Urinary symptoms.

## 2022-07-14 NOTE — PHYSICAL EXAM
[JVD] : no jugular venous distention  [Normal Thyroid] : the thyroid was normal [Carotid Bruits] : no carotid bruits [Normal Breath Sounds] : Normal breath sounds [Normal Heart Sounds] : normal heart sounds [Normal Rate and Rhythm] : normal rate and rhythm [No Rash or Lesion] : No rash or lesion [Alert] : alert [Oriented to Person] : oriented to person [Oriented to Place] : oriented to place [Oriented to Time] : oriented to time [Calm] : calm [de-identified] : well developed white male in no acute distress [de-identified] : noninjected and nonicteric [de-identified] : without adenopathy [de-identified] : normal bowel sounds, without distension or tenderness.\par Positive for an incarcerated umbilical hernia [de-identified] : normal testicles, with a left inguinal hernia, right sided repair intact [de-identified] : without masses of tenderness [de-identified] : without calf pain or swelling

## 2022-07-14 NOTE — ASSESSMENT
[FreeTextEntry1] : I have discussed the signs and symptoms of incarceration and the importance of calling immediately should they develop.\par I have also discussed the risks, benefits and options. Pt wants to schedule a robotic assisted laparoscopic repair of his left inguinal hernia and a repair of his incarcerated umbilical hernia.

## 2022-07-17 ENCOUNTER — NON-APPOINTMENT (OUTPATIENT)
Age: 52
End: 2022-07-17

## 2022-07-20 ENCOUNTER — APPOINTMENT (OUTPATIENT)
Dept: PULMONOLOGY | Facility: CLINIC | Age: 52
End: 2022-07-20

## 2022-07-20 VITALS — RESPIRATION RATE: 16 BRPM

## 2022-07-20 VITALS
HEART RATE: 72 BPM | TEMPERATURE: 98.1 F | BODY MASS INDEX: 30.29 KG/M2 | DIASTOLIC BLOOD PRESSURE: 98 MMHG | SYSTOLIC BLOOD PRESSURE: 142 MMHG | WEIGHT: 193 LBS | HEIGHT: 67 IN

## 2022-07-20 DIAGNOSIS — D86.9 SARCOIDOSIS, UNSPECIFIED: ICD-10-CM

## 2022-07-20 LAB — SARS-COV-2 RNA CT RESP QN NAA+PROBE: NEGATIVE

## 2022-07-20 PROCEDURE — ZZZZZ: CPT

## 2022-07-20 PROCEDURE — 94726 PLETHYSMOGRAPHY LUNG VOLUMES: CPT

## 2022-07-20 PROCEDURE — 99213 OFFICE O/P EST LOW 20 MIN: CPT | Mod: 25

## 2022-07-20 PROCEDURE — 87635 SARS-COV-2 COVID-19 AMP PRB: CPT

## 2022-07-20 PROCEDURE — 94010 BREATHING CAPACITY TEST: CPT

## 2022-07-20 PROCEDURE — 94729 DIFFUSING CAPACITY: CPT

## 2022-07-20 NOTE — ASSESSMENT
[FreeTextEntry1] : the patient's pulmonary functions are stable and have been stable for a while. I asked him to start taking 7 a half milligrams of prednisone on alternate days for the next 4 months. He knows to contact me should his breathing symptoms worsen. My plan is to slowly reduce the prednisone after getting to alternate day.

## 2022-07-20 NOTE — ASSESSMENT
[FreeTextEntry1] : the patient has mild restrictive ventilatory impairment. His lung functions are reasonably stable on alternate day prednisone. I will reduce to 5 mg on alternate day and see him before the end of the year. At that time I will also do blood work

## 2022-07-20 NOTE — HISTORY OF PRESENT ILLNESS
[TextBox_4] : 52-year-old male with extensive pulmonary sarcoid. Currently taking 7 mg of prednisone every other day. Reports that he gets mild dyspnea on exertion but feels about the same. He denies any new chest discomfort, joint pains or fevers. There were several days during the month that he felt a little more breathless but that has since passed.

## 2022-07-20 NOTE — HISTORY OF PRESENT ILLNESS
[TextBox_4] : 51-year-old male with extensive pulmonary and mediastinal sarcoid, currently on 5 mg of prednisone alternating with 2-1/2 mg. His only complaint is fatigue. He does report dyspnea after one flight of stairs.. However he has gained about 20 pounds. He denies any ophthalmologic, joint or skin symptoms

## 2022-07-20 NOTE — PHYSICAL EXAM
[No Acute Distress] : no acute distress [Normal Oropharynx] : normal oropharynx [Normal Appearance] : normal appearance [No Neck Mass] : no neck mass [Normal Rate/Rhythm] : normal rate/rhythm [Normal S1, S2] : normal s1, s2 [No Resp Distress] : no resp distress [Clear to Auscultation Bilaterally] : clear to auscultation bilaterally [Normal Gait] : normal gait [No Clubbing] : no clubbing [No Focal Deficits] : no focal deficits

## 2022-10-10 RX ORDER — PREDNISONE 5 MG/1
5 TABLET ORAL EVERY OTHER DAY
Qty: 50 | Refills: 1 | Status: ACTIVE | COMMUNITY
Start: 2020-01-08 | End: 1900-01-01

## 2022-12-06 ENCOUNTER — APPOINTMENT (OUTPATIENT)
Dept: SURGERY | Facility: CLINIC | Age: 52
End: 2022-12-06

## 2022-12-06 VITALS — TEMPERATURE: 97.7 F

## 2022-12-06 PROCEDURE — 99214 OFFICE O/P EST MOD 30 MIN: CPT

## 2022-12-06 NOTE — PHYSICAL EXAM
[Normal Breath Sounds] : Normal breath sounds [Normal Heart Sounds] : normal heart sounds [Normal Rate and Rhythm] : normal rate and rhythm [Calm] : calm [de-identified] : well developed male in no distress [de-identified] : without adenopathy [de-identified] : normal bowel sounds, without distension or tenderness\par With an incarcerated umbilical hernia. [de-identified] : Normal testicles, with a left inguinal hernia, right sided repair intact [de-identified] : without calf pain or swelling

## 2022-12-06 NOTE — ASSESSMENT
[FreeTextEntry1] : I have discussed with pt that he should see his pulmonary doctor prior to surgery to get his recommendations.\par Pt will also need stress steriods.\par I have again discussed the signs and symptoms of incarceration and strangulation and the importance of calling immediately should they develop.\par I have discussed the risks, benefits and options. Pt would like to schedule a robotic assisted laparoscopic repair ot his left inguinal hernia and a repair of his incarcerated umbilical hernia.

## 2022-12-06 NOTE — HISTORY OF PRESENT ILLNESS
[de-identified] : Incarcerated umbilical hernia and left inguinal hernia. [de-identified] : Pt wants to schedule his hernia repair. He denies any pain in the area, normal GI functioning,

## 2022-12-09 ENCOUNTER — APPOINTMENT (OUTPATIENT)
Dept: PULMONOLOGY | Facility: CLINIC | Age: 52
End: 2022-12-09

## 2022-12-09 VITALS
OXYGEN SATURATION: 97 % | TEMPERATURE: 97.6 F | BODY MASS INDEX: 29.98 KG/M2 | SYSTOLIC BLOOD PRESSURE: 131 MMHG | DIASTOLIC BLOOD PRESSURE: 83 MMHG | HEIGHT: 67 IN | HEART RATE: 85 BPM | WEIGHT: 191 LBS

## 2022-12-09 PROCEDURE — ZZZZZ: CPT

## 2022-12-09 PROCEDURE — 99213 OFFICE O/P EST LOW 20 MIN: CPT | Mod: 25

## 2022-12-09 PROCEDURE — 94726 PLETHYSMOGRAPHY LUNG VOLUMES: CPT

## 2022-12-09 PROCEDURE — 94010 BREATHING CAPACITY TEST: CPT

## 2022-12-09 PROCEDURE — 94729 DIFFUSING CAPACITY: CPT

## 2022-12-09 NOTE — HISTORY OF PRESENT ILLNESS
[TextBox_4] : 52-year-old male with extensive pulmonary sarcoidosis who apparently incarcerated inguinal hernia and will be scheduled for surgery in February. Currently he feels about the same. He has mild dyspnea on exertion but no significant coughing, wheezing or chest discomfort he does have an unusual complaint of heel discomfort in the past he has had paresthesias of his arm in an ulnar distribution as well as paresthesias of his leg. He had been seen by neurology who thought he might have carpal tunnel syndrome.

## 2022-12-09 NOTE — ASSESSMENT
[FreeTextEntry1] : The patient's lung functions show a slight decline in the residual volume resulting in a reduction in his total lung capacity. I am uncertain as to whether that's real or a variation in his functional residual capacity. His vital capacity and flow rates and diffusing capacity remain about the same.. Currently he is on 5 mg of prednisone on alternate days\par My plan is to reduce his dose to 20 mg on alternate days. I gave him an outline of prednisone dosages that he needs to use prior to and just post surgery - 30 mg, the day before and day of, 10 mg post one day, 5 mg second day and then 2 mg every other day. I will followup with him in April with repeat lung functions and if he is stable, I will repeat his imaging and consider stopping his prednisone.

## 2022-12-09 NOTE — PHYSICAL EXAM
[No Acute Distress] : no acute distress [Normal Appearance] : normal appearance [Normal Rate/Rhythm] : normal rate/rhythm [Normal S1, S2] : normal s1, s2 [No Resp Distress] : no resp distress [Clear to Auscultation Bilaterally] : clear to auscultation bilaterally [Normal Gait] : normal gait [Oriented x3] : oriented x3

## 2022-12-13 RX ORDER — PREDNISONE 10 MG/1
10 TABLET ORAL
Qty: 100 | Refills: 0 | Status: ACTIVE | COMMUNITY
Start: 2020-10-23 | End: 1900-01-01

## 2023-02-01 ENCOUNTER — OUTPATIENT (OUTPATIENT)
Dept: OUTPATIENT SERVICES | Facility: HOSPITAL | Age: 53
LOS: 1 days | End: 2023-02-01
Payer: COMMERCIAL

## 2023-02-01 VITALS
DIASTOLIC BLOOD PRESSURE: 86 MMHG | SYSTOLIC BLOOD PRESSURE: 138 MMHG | WEIGHT: 195.11 LBS | TEMPERATURE: 97 F | RESPIRATION RATE: 16 BRPM | OXYGEN SATURATION: 97 % | HEART RATE: 70 BPM

## 2023-02-01 DIAGNOSIS — Z90.89 ACQUIRED ABSENCE OF OTHER ORGANS: Chronic | ICD-10-CM

## 2023-02-01 DIAGNOSIS — Z01.818 ENCOUNTER FOR OTHER PREPROCEDURAL EXAMINATION: ICD-10-CM

## 2023-02-01 DIAGNOSIS — Z98.890 OTHER SPECIFIED POSTPROCEDURAL STATES: Chronic | ICD-10-CM

## 2023-02-01 DIAGNOSIS — D86.9 SARCOIDOSIS, UNSPECIFIED: ICD-10-CM

## 2023-02-01 DIAGNOSIS — Z98.89 OTHER SPECIFIED POSTPROCEDURAL STATES: Chronic | ICD-10-CM

## 2023-02-01 DIAGNOSIS — K40.90 UNILATERAL INGUINAL HERNIA, WITHOUT OBSTRUCTION OR GANGRENE, NOT SPECIFIED AS RECURRENT: ICD-10-CM

## 2023-02-01 DIAGNOSIS — K42.9 UMBILICAL HERNIA WITHOUT OBSTRUCTION OR GANGRENE: ICD-10-CM

## 2023-02-01 LAB
ALBUMIN SERPL ELPH-MCNC: 4 G/DL — SIGNIFICANT CHANGE UP (ref 3.3–5)
ALP SERPL-CCNC: 22 U/L — LOW (ref 40–120)
ALT FLD-CCNC: 27 U/L — SIGNIFICANT CHANGE UP (ref 12–78)
ANION GAP SERPL CALC-SCNC: 2 MMOL/L — LOW (ref 5–17)
AST SERPL-CCNC: 26 U/L — SIGNIFICANT CHANGE UP (ref 15–37)
BILIRUB SERPL-MCNC: 0.5 MG/DL — SIGNIFICANT CHANGE UP (ref 0.2–1.2)
BUN SERPL-MCNC: 15 MG/DL — SIGNIFICANT CHANGE UP (ref 7–23)
CALCIUM SERPL-MCNC: 9.3 MG/DL — SIGNIFICANT CHANGE UP (ref 8.5–10.1)
CHLORIDE SERPL-SCNC: 106 MMOL/L — SIGNIFICANT CHANGE UP (ref 96–108)
CO2 SERPL-SCNC: 30 MMOL/L — SIGNIFICANT CHANGE UP (ref 22–31)
CREAT SERPL-MCNC: 1.3 MG/DL — SIGNIFICANT CHANGE UP (ref 0.5–1.3)
EGFR: 66 ML/MIN/1.73M2 — SIGNIFICANT CHANGE UP
GLUCOSE SERPL-MCNC: 100 MG/DL — HIGH (ref 70–99)
HCT VFR BLD CALC: 45 % — SIGNIFICANT CHANGE UP (ref 39–50)
HGB BLD-MCNC: 14.6 G/DL — SIGNIFICANT CHANGE UP (ref 13–17)
MCHC RBC-ENTMCNC: 28.2 PG — SIGNIFICANT CHANGE UP (ref 27–34)
MCHC RBC-ENTMCNC: 32.4 GM/DL — SIGNIFICANT CHANGE UP (ref 32–36)
MCV RBC AUTO: 87 FL — SIGNIFICANT CHANGE UP (ref 80–100)
NRBC # BLD: 0 /100 WBCS — SIGNIFICANT CHANGE UP (ref 0–0)
PLATELET # BLD AUTO: 258 K/UL — SIGNIFICANT CHANGE UP (ref 150–400)
POTASSIUM SERPL-MCNC: 4.8 MMOL/L — SIGNIFICANT CHANGE UP (ref 3.5–5.3)
POTASSIUM SERPL-SCNC: 4.8 MMOL/L — SIGNIFICANT CHANGE UP (ref 3.5–5.3)
PROT SERPL-MCNC: 8.3 G/DL — SIGNIFICANT CHANGE UP (ref 6–8.3)
RBC # BLD: 5.17 M/UL — SIGNIFICANT CHANGE UP (ref 4.2–5.8)
RBC # FLD: 12.5 % — SIGNIFICANT CHANGE UP (ref 10.3–14.5)
SODIUM SERPL-SCNC: 138 MMOL/L — SIGNIFICANT CHANGE UP (ref 135–145)
WBC # BLD: 5.58 K/UL — SIGNIFICANT CHANGE UP (ref 3.8–10.5)
WBC # FLD AUTO: 5.58 K/UL — SIGNIFICANT CHANGE UP (ref 3.8–10.5)

## 2023-02-01 PROCEDURE — 93005 ELECTROCARDIOGRAM TRACING: CPT

## 2023-02-01 PROCEDURE — 36415 COLL VENOUS BLD VENIPUNCTURE: CPT

## 2023-02-01 PROCEDURE — G0463: CPT

## 2023-02-01 PROCEDURE — 71046 X-RAY EXAM CHEST 2 VIEWS: CPT | Mod: 26

## 2023-02-01 PROCEDURE — 80053 COMPREHEN METABOLIC PANEL: CPT

## 2023-02-01 PROCEDURE — 93010 ELECTROCARDIOGRAM REPORT: CPT

## 2023-02-01 PROCEDURE — 71046 X-RAY EXAM CHEST 2 VIEWS: CPT

## 2023-02-01 PROCEDURE — 85027 COMPLETE CBC AUTOMATED: CPT

## 2023-02-01 NOTE — H&P PST ADULT - PROBLEM SELECTOR PLAN 4
Medical clearance needed as per surgeon. CBC, Comprehensive panel, T&S, CXR and EKG ordered. Pre-op instructions and surgical scrubs given and pt verbalized understanding. COVID19 PCR testing to be done within 72 hours of surgery at [facility].

## 2023-02-01 NOTE — H&P PST ADULT - PROBLEM SELECTOR PROBLEM 3
Preoperative testing Unilateral inguinal hernia, without obstruction or gangrene, not specified as recurrent

## 2023-02-01 NOTE — H&P PST ADULT - MUSCULOSKELETAL
ROM intact/no calf tenderness negative normal/ROM intact/strength 5/5 bilateral upper extremities/strength 5/5 bilateral lower extremities

## 2023-02-01 NOTE — H&P PST ADULT - PROBLEM SELECTOR PROBLEM 2
Unilateral inguinal hernia, without obstruction or gangrene, not specified as recurrent Umbilical hernia

## 2023-02-01 NOTE — H&P PST ADULT - OTHER CARE PROVIDERS
PCP- Dr. Pool Cardiologist - Dr Chaparro - Pulmonary - Dr Hoang Cardiologist - Dr Chaparro - Pulmonary - Dr Hoang

## 2023-02-01 NOTE — H&P PST ADULT - NSICDXPASTSURGICALHX_GEN_ALL_CORE_FT
PAST SURGICAL HISTORY:  H/O hernia repair RIH repair 1/14    History of lung biopsy October 2014    S/P colonoscopy      PAST SURGICAL HISTORY:  H/O hernia repair RIH repair 1/14    History of ankle surgery     History of hydrocelectomy     History of lung biopsy October 2014    S/P colonoscopy     S/P excision of lipoma     S/P tonsillectomy

## 2023-02-01 NOTE — H&P PST ADULT - PROBLEM SELECTOR PLAN 2
Robot assisted laparoscopic repair left inguinal hernia on 2/17/2023. Robot assisted laparoscopic repair umbilical hernia on 2/17/2023.

## 2023-02-01 NOTE — H&P PST ADULT - ASSESSMENT
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

## 2023-02-01 NOTE — H&P PST ADULT - PROBLEM SELECTOR PLAN 3
Medical clearance needed as per surgeon. CBC, Comprehensive panel, T&S and EKG ordered. Pre-op instructions and surgical scrubs given and pt verbalized understanding. COVID19 PCR testing to be done within 72 hours of surgery at [facility]. Medical clearance needed as per surgeon. CBC, Comprehensive panel, T&S, CXR and EKG ordered. Pre-op instructions and surgical scrubs given and pt verbalized understanding. COVID19 PCR testing to be done within 72 hours of surgery at [facility]. Robot assisted laparoscopic repair left inguinal hernia on 2/17/2023.

## 2023-02-01 NOTE — H&P PST ADULT - GENERAL COMMENTS
Denies having traveled outside the country for the last 14 days. Denies having had the COVID19 infection and denies COVID19 positive contacts within the last 14 days. Denies recent illness in the last 2 weeks. Denies having traveled outside the country for the last 14 days. Denies COVID19 positive contacts within the last 14 days. Denies recent illness in the last 2 weeks.

## 2023-02-01 NOTE — H&P PST ADULT - PROBLEM SELECTOR PLAN 1
Pt seen by pulmonologist on 12/9/22 and the following recommendations were given regarding pt's Prednisone dose before and after surgery. "I gave him an outline of prednisone dosages that he needs to use prior to and just post surgery - 30 mg, the day before and day of, 10 mg post one day, 5 mg second day and then 2 mg every other day." Pt verbalized understanding of how to take Prednisone.

## 2023-02-01 NOTE — H&P PST ADULT - HISTORY OF PRESENT ILLNESS
45 year old male presents for PST prior to  Excision RIGHT Thigh mass with Dr Kalyan Sevilla on 3/4/16 - pt notes "the lump on the back of my right thigh(high up) started out really small and has gotten bigger over the past few years at least." Denies pain to area. Pt is electing for surgical procedure. 53 y/o male with PMH of sarcoidosis here for PST. Pt first noticed "lump to bellybutton about 2 years ago". Pt s/p CT scan and was diagnosed with umbilical hernia and left inguinal hernia. Pt denies pain of left groin and left groin bulge. Pt denies n/v/d and abdominal pain. Pt electing for robot assisted laparoscopic repair left inguinal hernia and repair umbilical hernia on 2/17/2023.  51 y/o male with PMH of sarcoidosis here for PST. Pt first noticed "lump to bellybutton about 2 years ago". Pt s/p CT scan and was diagnosed with umbilical hernia and left inguinal hernia. Pt reports to occasional left abdominal discomfort unrelated to activity. Pt denies pain of left groin and left groin bulge. Pt denies n/v/d and abdominal pain. Pt electing for robot assisted laparoscopic repair left inguinal hernia and repair umbilical hernia on 2/17/2023.

## 2023-02-01 NOTE — H&P PST ADULT - NSICDXPASTMEDICALHX_GEN_ALL_CORE_FT
PAST MEDICAL HISTORY:  Sarcoidosis      PAST MEDICAL HISTORY:  Sarcoidosis     Unilateral inguinal hernia, without obstruction or gangrene, not specified as recurrent      PAST MEDICAL HISTORY:  Sarcoidosis     Umbilical hernia     Unilateral inguinal hernia, without obstruction or gangrene, not specified as recurrent

## 2023-02-01 NOTE — H&P PST ADULT - GEN GEN HX ROS MEA POS PC
Pt states he had the COVID19 infection in 1/2022, denies complications. Pt states he had the COVID19 infection in 2020, denies complications.

## 2023-02-11 PROBLEM — K40.90 UNILATERAL INGUINAL HERNIA, WITHOUT OBSTRUCTION OR GANGRENE, NOT SPECIFIED AS RECURRENT: Chronic | Status: ACTIVE | Noted: 2023-02-01

## 2023-02-11 PROBLEM — K42.9 UMBILICAL HERNIA WITHOUT OBSTRUCTION OR GANGRENE: Chronic | Status: ACTIVE | Noted: 2023-02-01

## 2023-02-15 ENCOUNTER — OUTPATIENT (OUTPATIENT)
Dept: OUTPATIENT SERVICES | Facility: HOSPITAL | Age: 53
LOS: 1 days | End: 2023-02-15
Payer: COMMERCIAL

## 2023-02-15 DIAGNOSIS — Z98.890 OTHER SPECIFIED POSTPROCEDURAL STATES: Chronic | ICD-10-CM

## 2023-02-15 DIAGNOSIS — Z20.828 CONTACT WITH AND (SUSPECTED) EXPOSURE TO OTHER VIRAL COMMUNICABLE DISEASES: ICD-10-CM

## 2023-02-15 DIAGNOSIS — Z90.89 ACQUIRED ABSENCE OF OTHER ORGANS: Chronic | ICD-10-CM

## 2023-02-15 DIAGNOSIS — Z98.89 OTHER SPECIFIED POSTPROCEDURAL STATES: Chronic | ICD-10-CM

## 2023-02-15 PROCEDURE — U0003: CPT

## 2023-02-15 PROCEDURE — U0005: CPT

## 2023-02-16 ENCOUNTER — TRANSCRIPTION ENCOUNTER (OUTPATIENT)
Age: 53
End: 2023-02-16

## 2023-02-16 LAB — SARS-COV-2 RNA SPEC QL NAA+PROBE: SIGNIFICANT CHANGE UP

## 2023-02-17 ENCOUNTER — OUTPATIENT (OUTPATIENT)
Dept: OUTPATIENT SERVICES | Facility: HOSPITAL | Age: 53
LOS: 1 days | End: 2023-02-17
Payer: COMMERCIAL

## 2023-02-17 ENCOUNTER — TRANSCRIPTION ENCOUNTER (OUTPATIENT)
Age: 53
End: 2023-02-17

## 2023-02-17 ENCOUNTER — APPOINTMENT (OUTPATIENT)
Dept: SURGERY | Facility: HOSPITAL | Age: 53
End: 2023-02-17

## 2023-02-17 ENCOUNTER — RESULT REVIEW (OUTPATIENT)
Age: 53
End: 2023-02-17

## 2023-02-17 VITALS
TEMPERATURE: 98 F | RESPIRATION RATE: 14 BRPM | HEART RATE: 63 BPM | SYSTOLIC BLOOD PRESSURE: 127 MMHG | DIASTOLIC BLOOD PRESSURE: 86 MMHG | OXYGEN SATURATION: 95 %

## 2023-02-17 VITALS
HEART RATE: 83 BPM | HEIGHT: 65 IN | RESPIRATION RATE: 17 BRPM | OXYGEN SATURATION: 96 % | SYSTOLIC BLOOD PRESSURE: 135 MMHG | DIASTOLIC BLOOD PRESSURE: 90 MMHG | TEMPERATURE: 98 F | WEIGHT: 194.89 LBS

## 2023-02-17 DIAGNOSIS — Z98.890 OTHER SPECIFIED POSTPROCEDURAL STATES: Chronic | ICD-10-CM

## 2023-02-17 DIAGNOSIS — K40.90 UNILATERAL INGUINAL HERNIA, WITHOUT OBSTRUCTION OR GANGRENE, NOT SPECIFIED AS RECURRENT: ICD-10-CM

## 2023-02-17 DIAGNOSIS — K42.0 UMBILICAL HERNIA WITH OBSTRUCTION, WITHOUT GANGRENE: ICD-10-CM

## 2023-02-17 DIAGNOSIS — Z01.818 ENCOUNTER FOR OTHER PREPROCEDURAL EXAMINATION: ICD-10-CM

## 2023-02-17 DIAGNOSIS — Z98.89 OTHER SPECIFIED POSTPROCEDURAL STATES: Chronic | ICD-10-CM

## 2023-02-17 DIAGNOSIS — Z90.89 ACQUIRED ABSENCE OF OTHER ORGANS: Chronic | ICD-10-CM

## 2023-02-17 LAB — BLD GP AB SCN SERPL QL: SIGNIFICANT CHANGE UP

## 2023-02-17 PROCEDURE — 36415 COLL VENOUS BLD VENIPUNCTURE: CPT

## 2023-02-17 PROCEDURE — 88305 TISSUE EXAM BY PATHOLOGIST: CPT

## 2023-02-17 PROCEDURE — 86900 BLOOD TYPING SEROLOGIC ABO: CPT

## 2023-02-17 PROCEDURE — 49650 LAP ING HERNIA REPAIR INIT: CPT | Mod: LT

## 2023-02-17 PROCEDURE — 49594 RPR AA HRN 1ST 3-10 NCR/STRN: CPT | Mod: XS

## 2023-02-17 PROCEDURE — S2900: CPT

## 2023-02-17 PROCEDURE — 86901 BLOOD TYPING SEROLOGIC RH(D): CPT

## 2023-02-17 PROCEDURE — 88305 TISSUE EXAM BY PATHOLOGIST: CPT | Mod: 26

## 2023-02-17 PROCEDURE — S2900 ROBOTIC SURGICAL SYSTEM: CPT | Mod: NC

## 2023-02-17 PROCEDURE — 86850 RBC ANTIBODY SCREEN: CPT

## 2023-02-17 PROCEDURE — C1781: CPT

## 2023-02-17 PROCEDURE — 49650 LAP ING HERNIA REPAIR INIT: CPT | Mod: RT

## 2023-02-17 PROCEDURE — 49650 LAP ING HERNIA REPAIR INIT: CPT | Mod: AS,LT,XS

## 2023-02-17 PROCEDURE — 49594 RPR AA HRN 1ST 3-10 NCR/STRN: CPT

## 2023-02-17 DEVICE — MESH HERNIA INGUINAL PARIETEX PROGRIP RECTANGLE 15 X 15CM: Type: IMPLANTABLE DEVICE | Status: FUNCTIONAL

## 2023-02-17 RX ORDER — SODIUM CHLORIDE 9 MG/ML
1000 INJECTION, SOLUTION INTRAVENOUS
Refills: 0 | Status: DISCONTINUED | OUTPATIENT
Start: 2023-02-17 | End: 2023-02-17

## 2023-02-17 RX ORDER — SODIUM CHLORIDE 9 MG/ML
250 INJECTION INTRAMUSCULAR; INTRAVENOUS; SUBCUTANEOUS
Refills: 0 | Status: COMPLETED | OUTPATIENT
Start: 2023-02-17 | End: 2023-02-17

## 2023-02-17 RX ORDER — HYDROMORPHONE HYDROCHLORIDE 2 MG/ML
0.5 INJECTION INTRAMUSCULAR; INTRAVENOUS; SUBCUTANEOUS
Refills: 0 | Status: DISCONTINUED | OUTPATIENT
Start: 2023-02-17 | End: 2023-02-17

## 2023-02-17 RX ORDER — OXYCODONE HYDROCHLORIDE 5 MG/1
1 TABLET ORAL
Qty: 15 | Refills: 0
Start: 2023-02-17

## 2023-02-17 RX ORDER — ONDANSETRON 8 MG/1
4 TABLET, FILM COATED ORAL ONCE
Refills: 0 | Status: DISCONTINUED | OUTPATIENT
Start: 2023-02-17 | End: 2023-02-17

## 2023-02-17 RX ORDER — OXYCODONE HYDROCHLORIDE 5 MG/1
5 TABLET ORAL ONCE
Refills: 0 | Status: DISCONTINUED | OUTPATIENT
Start: 2023-02-17 | End: 2023-02-17

## 2023-02-17 RX ORDER — IBUPROFEN 200 MG
1 TABLET ORAL
Qty: 20 | Refills: 0
Start: 2023-02-17

## 2023-02-17 RX ORDER — ACETAMINOPHEN 500 MG
2 TABLET ORAL
Qty: 40 | Refills: 0
Start: 2023-02-17

## 2023-02-17 RX ORDER — DOCUSATE SODIUM 100 MG
1 CAPSULE ORAL
Qty: 60 | Refills: 0
Start: 2023-02-17

## 2023-02-17 RX ADMIN — SODIUM CHLORIDE 75 MILLILITER(S): 9 INJECTION, SOLUTION INTRAVENOUS at 10:05

## 2023-02-17 RX ADMIN — OXYCODONE HYDROCHLORIDE 5 MILLIGRAM(S): 5 TABLET ORAL at 11:45

## 2023-02-17 RX ADMIN — OXYCODONE HYDROCHLORIDE 5 MILLIGRAM(S): 5 TABLET ORAL at 12:17

## 2023-02-17 RX ADMIN — SODIUM CHLORIDE 250 MILLILITER(S): 9 INJECTION INTRAMUSCULAR; INTRAVENOUS; SUBCUTANEOUS at 11:06

## 2023-02-17 RX ADMIN — SODIUM CHLORIDE 75 MILLILITER(S): 9 INJECTION, SOLUTION INTRAVENOUS at 06:46

## 2023-02-17 RX ADMIN — SODIUM CHLORIDE 250 MILLILITER(S): 9 INJECTION INTRAMUSCULAR; INTRAVENOUS; SUBCUTANEOUS at 10:06

## 2023-02-17 NOTE — ASU DISCHARGE PLAN (ADULT/PEDIATRIC) - CARE PROVIDER_API CALL
Kalyan Sevilla)  Surgery  [address] [place] [id number]  Phone: [phone]  Fax: [phone]  Established Patient  Follow Up Time:

## 2023-02-17 NOTE — BRIEF OPERATIVE NOTE - NSICDXBRIEFPREOP_GEN_ALL_CORE_FT
PRE-OP DIAGNOSIS:  Unilateral inguinal hernia, without obstruction or gangrene, not specified as recurrent 17-Feb-2023 09:50:23  Krishna Ziegler  Incarcerated umbilical hernia 17-Feb-2023 09:50:56  Krishna Ziegler

## 2023-02-17 NOTE — BRIEF OPERATIVE NOTE - NSICDXBRIEFPOSTOP_GEN_ALL_CORE_FT
POST-OP DIAGNOSIS:  Incarcerated umbilical hernia 17-Feb-2023 09:51:05  Krishna Ziegler  Unilateral inguinal hernia, without obstruction or gangrene, not specified as recurrent 17-Feb-2023 09:50:33  Krishna Ziegler

## 2023-02-17 NOTE — BRIEF OPERATIVE NOTE - NSICDXBRIEFPROCEDURE_GEN_ALL_CORE_FT
PROCEDURES:  Repair, hernia, inguinal, unilateral, robot-assisted, using da Santiago Xi, with umbilical hernia repair 17-Feb-2023 09:49:38  Krishna Ziegler

## 2023-02-21 LAB — SURGICAL PATHOLOGY STUDY: SIGNIFICANT CHANGE UP

## 2023-02-23 ENCOUNTER — APPOINTMENT (OUTPATIENT)
Dept: SURGERY | Facility: CLINIC | Age: 53
End: 2023-02-23
Payer: COMMERCIAL

## 2023-02-23 VITALS — TEMPERATURE: 97.2 F

## 2023-02-23 PROCEDURE — 99024 POSTOP FOLLOW-UP VISIT: CPT

## 2023-02-23 NOTE — PHYSICAL EXAM
[Normal Breath Sounds] : Normal breath sounds [Normal Heart Sounds] : normal heart sounds [Normal Rate and Rhythm] : normal rate and rhythm [Calm] : calm [de-identified] : well developed male in no acute distress [de-identified] : + Bowel sounds, Incisions clean and closed.  Mild swelling near umbilical incision, repair intact [de-identified] : Minimal groin swelling Bilateral, no scrotal edxema bilateral [de-identified] : Calves non-tender, soft, No le swelling

## 2023-02-23 NOTE — PLAN
[FreeTextEntry1] : -Continue Colace and Miralax as needed\par -Avoid heavy lifting\par -Follow up in 4 weeks

## 2023-02-23 NOTE — HISTORY OF PRESENT ILLNESS
[de-identified] : s/p Robotic Assisted Laparoscopic Repair Left Inguinal Hernia and Repair Incarcerated Umbilical Hernia 02/17/2023 [de-identified] : Patient presents for follow up.  Patient denies fever, chills or wound drainage.  Patient tolerating a regular diet and moving his bowels with help of Colace and Miralax prn.\par  \par Pathology 02/17/2023: Incarcerated omentum, mature adipose and fibrous tissue

## 2023-03-23 ENCOUNTER — APPOINTMENT (OUTPATIENT)
Dept: SURGERY | Facility: CLINIC | Age: 53
End: 2023-03-23
Payer: COMMERCIAL

## 2023-03-23 VITALS — TEMPERATURE: 97.6 F

## 2023-03-23 DIAGNOSIS — K40.90 UNILATERAL INGUINAL HERNIA, W/OUT OBSTRUCTION OR GANGRENE, NOT SPECIFIED AS RECURRENT: ICD-10-CM

## 2023-03-23 DIAGNOSIS — K42.0 UMBILICAL HERNIA WITH OBSTRUCTION, W/OUT GANGRENE: ICD-10-CM

## 2023-03-23 PROCEDURE — 99024 POSTOP FOLLOW-UP VISIT: CPT

## 2023-03-23 NOTE — HISTORY OF PRESENT ILLNESS
[de-identified] : s/p Robotic Assisted Laparoscopic Repair LIH and Incarcerated Umbilical Hernia 02/17/2023 [de-identified] : Patient denies fever, chills, nausea and vomiting.  Patient with mild swelling and occasional soreness Left groin.  Patient with normal bowel and bladder function.

## 2023-03-23 NOTE — PLAN
[FreeTextEntry1] : -Avoid heavy lifting or abdominal/core exercises\par -NSAID's with food and warm compresses prn\par -Follow up prn

## 2023-03-23 NOTE — PHYSICAL EXAM
[Normal Breath Sounds] : Normal breath sounds [Normal Heart Sounds] : normal heart sounds [Normal Rate and Rhythm] : normal rate and rhythm [Calm] : calm [de-identified] : well developed white male in no acute distress [de-identified] : Mild groin swelling Left, no ecchymosis, Hernia repair intact left [de-identified] : + Bowel sounds, soft, incisions clean and closed, Umbilical Hernia repair intact [de-identified] : Calves soft, non-tender, no le swelling

## 2023-04-12 NOTE — ASU DISCHARGE PLAN (ADULT/PEDIATRIC) - NS MD DC FALL RISK RISK
For information on Fall & Injury Prevention, visit: [email or web address] OR  [email or web address] OR  https://www.cdc.gov/steadi/patient.html Cannot urinate

## 2023-04-26 ENCOUNTER — APPOINTMENT (OUTPATIENT)
Dept: PULMONOLOGY | Facility: CLINIC | Age: 53
End: 2023-04-26
Payer: COMMERCIAL

## 2023-04-26 VITALS
HEIGHT: 67 IN | WEIGHT: 190 LBS | OXYGEN SATURATION: 97 % | SYSTOLIC BLOOD PRESSURE: 138 MMHG | DIASTOLIC BLOOD PRESSURE: 87 MMHG | HEART RATE: 89 BPM | BODY MASS INDEX: 29.82 KG/M2

## 2023-04-26 DIAGNOSIS — D86.0 SARCOIDOSIS OF LUNG: ICD-10-CM

## 2023-04-26 PROCEDURE — 94729 DIFFUSING CAPACITY: CPT

## 2023-04-26 PROCEDURE — ZZZZZ: CPT

## 2023-04-26 PROCEDURE — 94726 PLETHYSMOGRAPHY LUNG VOLUMES: CPT

## 2023-04-26 PROCEDURE — 94010 BREATHING CAPACITY TEST: CPT

## 2023-04-26 PROCEDURE — 99213 OFFICE O/P EST LOW 20 MIN: CPT | Mod: 25

## 2023-04-26 NOTE — PHYSICAL EXAM
[No Acute Distress] : no acute distress [Normal Appearance] : normal appearance [Normal Rate/Rhythm] : normal rate/rhythm [Normal S1, S2] : normal s1, s2 [No Resp Distress] : no resp distress [Clear to Auscultation Bilaterally] : clear to auscultation bilaterally [TextBox_125] : Eczema left forearm

## 2023-04-26 NOTE — HISTORY OF PRESENT ILLNESS
[TextBox_4] : 53 year-old male With known pulmonary sarcoid. Patient is currently on 2-1/2 mg of prednisone alternate days. He does complain of mild dyspnea. He denies coughing, wheezing or chest discomfort he does have a pruritic skin eruption on his left forearm which looks eczematous. He denies any joint pains or any other systemic complaints related to sarcoid.

## 2023-04-26 NOTE — ASSESSMENT
[FreeTextEntry1] : The patient's lung function show a very slight decline in lung volumes and diffusing capacity. I had a jayna discussion with him about the steroid dose and how it's currently maintaining him at a slightly reduced lung function level. I discussed risk versus benefits of either increasing or decreasing the prednisone. The current plan is to image him in and see where we are at and have another discussion.

## 2023-05-24 ENCOUNTER — NON-APPOINTMENT (OUTPATIENT)
Age: 53
End: 2023-05-24

## 2023-12-06 ENCOUNTER — RX RENEWAL (OUTPATIENT)
Age: 53
End: 2023-12-06

## 2023-12-06 RX ORDER — PREDNISONE 2.5 MG/1
2.5 TABLET ORAL
Qty: 100 | Refills: 2 | Status: ACTIVE | COMMUNITY
Start: 2020-01-08 | End: 1900-01-01

## 2024-10-07 ENCOUNTER — APPOINTMENT (OUTPATIENT)
Dept: NEPHROLOGY | Facility: CLINIC | Age: 54
End: 2024-10-07
Payer: COMMERCIAL

## 2024-10-07 VITALS
RESPIRATION RATE: 16 BRPM | HEART RATE: 76 BPM | BODY MASS INDEX: 31.32 KG/M2 | DIASTOLIC BLOOD PRESSURE: 80 MMHG | WEIGHT: 200 LBS | SYSTOLIC BLOOD PRESSURE: 120 MMHG

## 2024-10-07 DIAGNOSIS — E55.9 VITAMIN D DEFICIENCY, UNSPECIFIED: ICD-10-CM

## 2024-10-07 DIAGNOSIS — N17.9 ACUTE KIDNEY FAILURE, UNSPECIFIED: ICD-10-CM

## 2024-10-07 PROCEDURE — 99204 OFFICE O/P NEW MOD 45 MIN: CPT

## 2024-10-07 RX ORDER — METHOTREXATE 2.5 MG/1
2.5 TABLET ORAL
Qty: 48 | Refills: 0 | Status: DISCONTINUED | COMMUNITY
Start: 2024-06-24

## 2024-10-07 RX ORDER — FOLIC ACID 1 MG/1
1 TABLET ORAL
Qty: 90 | Refills: 0 | Status: DISCONTINUED | COMMUNITY
Start: 2024-06-07

## 2024-10-07 RX ORDER — SULFAMETHOXAZOLE AND TRIMETHOPRIM 800; 160 MG/1; MG/1
800-160 TABLET ORAL
Qty: 12 | Refills: 0 | Status: DISCONTINUED | COMMUNITY
Start: 2024-04-07

## 2024-10-07 RX ORDER — MYCOPHENOLATE MOFETIL 500 MG/1
500 TABLET ORAL 3 TIMES DAILY
Refills: 0 | Status: ACTIVE | COMMUNITY
Start: 2024-09-17

## 2024-10-07 RX ORDER — HYDROXYCHLOROQUINE SULFATE 200 MG/1
200 TABLET, FILM COATED ORAL
Qty: 180 | Refills: 0 | Status: DISCONTINUED | COMMUNITY
Start: 2024-05-07

## 2024-10-08 PROBLEM — E55.9 VITAMIN D DEFICIENCY, UNSPECIFIED: Status: ACTIVE | Noted: 2024-10-08

## 2024-10-08 LAB
25(OH)D3 SERPL-MCNC: 26.1 NG/ML
ALBUMIN SERPL ELPH-MCNC: 4.4 G/DL
ANION GAP SERPL CALC-SCNC: 15 MMOL/L
APPEARANCE: CLEAR
BACTERIA: NEGATIVE /HPF
BASOPHILS # BLD AUTO: 0.05 K/UL
BASOPHILS NFR BLD AUTO: 0.4 %
BILIRUBIN URINE: NEGATIVE
BLOOD URINE: NEGATIVE
BUN SERPL-MCNC: 17 MG/DL
C3 SERPL-MCNC: 130 MG/DL
C4 SERPL-MCNC: 18 MG/DL
CALCIUM SERPL-MCNC: 9.5 MG/DL
CAST: 0 /LPF
CHLORIDE SERPL-SCNC: 104 MMOL/L
CO2 SERPL-SCNC: 24 MMOL/L
COLOR: YELLOW
CREAT SERPL-MCNC: 1.25 MG/DL
CREAT SPEC-SCNC: 147 MG/DL
CREAT/PROT UR: 0.1 RATIO
EGFR: 68 ML/MIN/1.73M2
EOSINOPHIL # BLD AUTO: 0.01 K/UL
EOSINOPHIL NFR BLD AUTO: 0.1 %
EPITHELIAL CELLS: 0 /HPF
GLUCOSE QUALITATIVE U: NEGATIVE MG/DL
GLUCOSE SERPL-MCNC: 115 MG/DL
HCT VFR BLD CALC: 43 %
HGB BLD-MCNC: 13.2 G/DL
IMM GRANULOCYTES NFR BLD AUTO: 0.5 %
KETONES URINE: NEGATIVE MG/DL
LEUKOCYTE ESTERASE URINE: NEGATIVE
LYMPHOCYTES # BLD AUTO: 0.9 K/UL
LYMPHOCYTES NFR BLD AUTO: 7.1 %
MAN DIFF?: NORMAL
MCHC RBC-ENTMCNC: 29.7 PG
MCHC RBC-ENTMCNC: 30.7 GM/DL
MCV RBC AUTO: 96.6 FL
MICROSCOPIC-UA: NORMAL
MONOCYTES # BLD AUTO: 0.91 K/UL
MONOCYTES NFR BLD AUTO: 7.2 %
NEUTROPHILS # BLD AUTO: 10.69 K/UL
NEUTROPHILS NFR BLD AUTO: 84.7 %
NITRITE URINE: NEGATIVE
PH URINE: 5.5
PHOSPHATE SERPL-MCNC: 3.5 MG/DL
PLATELET # BLD AUTO: 294 K/UL
POTASSIUM SERPL-SCNC: 4.7 MMOL/L
PROT UR-MCNC: 9 MG/DL
PROTEIN URINE: NEGATIVE MG/DL
RBC # BLD: 4.45 M/UL
RBC # FLD: 13.2 %
RED BLOOD CELLS URINE: 2 /HPF
SODIUM SERPL-SCNC: 143 MMOL/L
SPECIFIC GRAVITY URINE: 1.02
UROBILINOGEN URINE: 0.2 MG/DL
WBC # FLD AUTO: 12.62 K/UL
WHITE BLOOD CELLS URINE: 1 /HPF

## 2024-10-08 RX ORDER — ERGOCALCIFEROL 1.25 MG/1
1.25 MG CAPSULE, LIQUID FILLED ORAL
Qty: 14 | Refills: 3 | Status: ACTIVE | COMMUNITY
Start: 2024-10-08 | End: 1900-01-01

## 2025-04-08 ENCOUNTER — APPOINTMENT (OUTPATIENT)
Dept: NEPHROLOGY | Facility: CLINIC | Age: 55
End: 2025-04-08

## (undated) DEVICE — SOL IRR BAG NS 0.9% 1000ML

## (undated) DEVICE — SUT MONOCRYL 4-0 27" PS-2 UNDYED

## (undated) DEVICE — SUT POLYSORB 0 30" GU-46

## (undated) DEVICE — XI DRAPE COLUMN

## (undated) DEVICE — XI 12MM AND STAPLER CANNULA SEAL

## (undated) DEVICE — LIGASURE MARYLAND 37CM

## (undated) DEVICE — NDL HYPO SAFE 25G X 1.5" (ORANGE)

## (undated) DEVICE — XI TIP COVER

## (undated) DEVICE — DRAPE 3/4 SHEET W REINFORCEMENT 56X77"

## (undated) DEVICE — PLV/PSP-SUCTION IRRIGATOR STRYKER: Type: DURABLE MEDICAL EQUIPMENT

## (undated) DEVICE — FOLEY TRAY 16FR LF URINE METER SURESTEP

## (undated) DEVICE — XI SEAL UNIV 5- 8 MM

## (undated) DEVICE — XI OBTURATOR OPTICAL BLADELESS 8MM

## (undated) DEVICE — PLV-SCD MACHINE: Type: DURABLE MEDICAL EQUIPMENT

## (undated) DEVICE — XI ENDOWRIST 12 - 8 MM CANNULA REDUCER

## (undated) DEVICE — XI DRAPE ARM

## (undated) DEVICE — WARMING BLANKET UPPER ADULT

## (undated) DEVICE — DRSG MASTISOL

## (undated) DEVICE — DRSG STERISTRIPS 0.5 X 4"

## (undated) DEVICE — PACK GENERAL LAPAROSCOPY

## (undated) DEVICE — ELCTR BOVIE TIP BLADE INSULATED 2.75" EDGE

## (undated) DEVICE — SOL IRR POUR H2O 1000ML

## (undated) DEVICE — SOL IRR BAG NS 0.9% 3000ML

## (undated) DEVICE — BLADE SCALPEL SAFETYLOCK #15

## (undated) DEVICE — TUBING STRYKER PNEUMOSURE HEATED RTP

## (undated) DEVICE — Device

## (undated) DEVICE — GLV 8 PROTEXIS (WHITE)

## (undated) DEVICE — VENODYNE/SCD SLEEVE CALF LARGE

## (undated) DEVICE — D HELP - CLEARVIEW CLEARIFY SYSTEM

## (undated) DEVICE — SOL IRR POUR NS 0.9% 1000ML

## (undated) DEVICE — VENODYNE/SCD SLEEVE CALF MEDIUM